# Patient Record
Sex: FEMALE | Race: WHITE | Employment: OTHER | URBAN - METROPOLITAN AREA
[De-identification: names, ages, dates, MRNs, and addresses within clinical notes are randomized per-mention and may not be internally consistent; named-entity substitution may affect disease eponyms.]

---

## 2017-05-02 ENCOUNTER — TELEPHONE (OUTPATIENT)
Dept: GASTROENTEROLOGY | Age: 62
End: 2017-05-02

## 2017-05-02 DIAGNOSIS — Z12.11 COLON CANCER SCREENING: Primary | ICD-10-CM

## 2017-05-05 PROBLEM — Z12.11 COLON CANCER SCREENING: Status: ACTIVE | Noted: 2017-05-05

## 2017-05-30 ENCOUNTER — ANESTHESIA EVENT (OUTPATIENT)
Dept: OPERATING ROOM | Age: 62
End: 2017-05-30
Payer: COMMERCIAL

## 2017-05-30 ENCOUNTER — ANESTHESIA (OUTPATIENT)
Dept: OPERATING ROOM | Age: 62
End: 2017-05-30
Payer: COMMERCIAL

## 2017-05-30 ENCOUNTER — HOSPITAL ENCOUNTER (OUTPATIENT)
Age: 62
Setting detail: OUTPATIENT SURGERY
Discharge: HOME | End: 2017-05-30
Attending: INTERNAL MEDICINE | Admitting: INTERNAL MEDICINE
Payer: COMMERCIAL

## 2017-05-30 VITALS
WEIGHT: 209 LBS | RESPIRATION RATE: 16 BRPM | HEART RATE: 62 BPM | SYSTOLIC BLOOD PRESSURE: 123 MMHG | DIASTOLIC BLOOD PRESSURE: 62 MMHG | TEMPERATURE: 98.6 F | OXYGEN SATURATION: 100 % | BODY MASS INDEX: 33.59 KG/M2 | HEIGHT: 66 IN

## 2017-05-30 VITALS
SYSTOLIC BLOOD PRESSURE: 110 MMHG | OXYGEN SATURATION: 100 % | DIASTOLIC BLOOD PRESSURE: 54 MMHG | RESPIRATION RATE: 17 BRPM

## 2017-05-30 PROCEDURE — 6360000002 HC RX W HCPCS: Performed by: NURSE ANESTHETIST, CERTIFIED REGISTERED

## 2017-05-30 PROCEDURE — 2580000003 HC RX 258: Performed by: INTERNAL MEDICINE

## 2017-05-30 PROCEDURE — 2500000003 HC RX 250 WO HCPCS: Performed by: NURSE ANESTHETIST, CERTIFIED REGISTERED

## 2017-05-30 PROCEDURE — 3700000001 HC ADD 15 MINUTES (ANESTHESIA): Performed by: INTERNAL MEDICINE

## 2017-05-30 PROCEDURE — 3609027000 HC COLONOSCOPY: Performed by: INTERNAL MEDICINE

## 2017-05-30 PROCEDURE — 3700000000 HC ANESTHESIA ATTENDED CARE: Performed by: INTERNAL MEDICINE

## 2017-05-30 PROCEDURE — 7100000010 HC PHASE II RECOVERY - FIRST 15 MIN: Performed by: INTERNAL MEDICINE

## 2017-05-30 PROCEDURE — 7100000011 HC PHASE II RECOVERY - ADDTL 15 MIN: Performed by: INTERNAL MEDICINE

## 2017-05-30 PROCEDURE — 45378 DIAGNOSTIC COLONOSCOPY: CPT | Performed by: INTERNAL MEDICINE

## 2017-05-30 RX ORDER — LIDOCAINE HYDROCHLORIDE 20 MG/ML
INJECTION, SOLUTION INFILTRATION; PERINEURAL PRN
Status: DISCONTINUED | OUTPATIENT
Start: 2017-05-30 | End: 2017-05-30 | Stop reason: SDUPTHER

## 2017-05-30 RX ORDER — FENTANYL CITRATE 50 UG/ML
INJECTION, SOLUTION INTRAMUSCULAR; INTRAVENOUS PRN
Status: DISCONTINUED | OUTPATIENT
Start: 2017-05-30 | End: 2017-05-30 | Stop reason: SDUPTHER

## 2017-05-30 RX ORDER — PROPOFOL 10 MG/ML
INJECTION, EMULSION INTRAVENOUS PRN
Status: DISCONTINUED | OUTPATIENT
Start: 2017-05-30 | End: 2017-05-30 | Stop reason: SDUPTHER

## 2017-05-30 RX ORDER — SODIUM CHLORIDE, SODIUM LACTATE, POTASSIUM CHLORIDE, CALCIUM CHLORIDE 600; 310; 30; 20 MG/100ML; MG/100ML; MG/100ML; MG/100ML
INJECTION, SOLUTION INTRAVENOUS CONTINUOUS
Status: DISCONTINUED | OUTPATIENT
Start: 2017-05-30 | End: 2017-05-30 | Stop reason: HOSPADM

## 2017-05-30 RX ORDER — MIDAZOLAM HYDROCHLORIDE 1 MG/ML
INJECTION INTRAMUSCULAR; INTRAVENOUS PRN
Status: DISCONTINUED | OUTPATIENT
Start: 2017-05-30 | End: 2017-05-30 | Stop reason: SDUPTHER

## 2017-05-30 RX ADMIN — SODIUM CHLORIDE, POTASSIUM CHLORIDE, SODIUM LACTATE AND CALCIUM CHLORIDE: 600; 310; 30; 20 INJECTION, SOLUTION INTRAVENOUS at 08:31

## 2017-05-30 RX ADMIN — MIDAZOLAM HYDROCHLORIDE 2 MG: 1 INJECTION, SOLUTION INTRAMUSCULAR; INTRAVENOUS at 09:21

## 2017-05-30 RX ADMIN — PROPOFOL 50 MG: 10 INJECTION, EMULSION INTRAVENOUS at 09:21

## 2017-05-30 RX ADMIN — FENTANYL CITRATE 25 MCG: 50 INJECTION INTRAMUSCULAR; INTRAVENOUS at 09:21

## 2017-05-30 RX ADMIN — LIDOCAINE HYDROCHLORIDE 100 MG: 20 INJECTION, SOLUTION INFILTRATION; PERINEURAL at 09:21

## 2017-05-30 RX ADMIN — FENTANYL CITRATE 50 MCG: 50 INJECTION INTRAMUSCULAR; INTRAVENOUS at 09:23

## 2017-05-30 RX ADMIN — SODIUM CHLORIDE, POTASSIUM CHLORIDE, SODIUM LACTATE AND CALCIUM CHLORIDE: 600; 310; 30; 20 INJECTION, SOLUTION INTRAVENOUS at 09:12

## 2017-05-30 ASSESSMENT — PAIN - FUNCTIONAL ASSESSMENT: PAIN_FUNCTIONAL_ASSESSMENT: 0-10

## 2017-05-30 ASSESSMENT — PAIN SCALES - GENERAL
PAINLEVEL_OUTOF10: 0

## 2018-01-18 ENCOUNTER — HOSPITAL ENCOUNTER (OUTPATIENT)
Dept: WOMENS IMAGING | Age: 63
Discharge: HOME | End: 2018-01-18
Payer: COMMERCIAL

## 2018-01-18 DIAGNOSIS — Z12.31 VISIT FOR SCREENING MAMMOGRAM: ICD-10-CM

## 2018-01-18 PROCEDURE — 77067 SCR MAMMO BI INCL CAD: CPT

## 2018-09-26 PROBLEM — Z12.11 COLON CANCER SCREENING: Status: RESOLVED | Noted: 2017-05-05 | Resolved: 2018-09-26

## 2019-01-31 ENCOUNTER — HOSPITAL ENCOUNTER (OUTPATIENT)
Dept: WOMENS IMAGING | Age: 64
Discharge: HOME | End: 2019-02-02
Payer: COMMERCIAL

## 2019-01-31 DIAGNOSIS — Z12.39 BREAST CANCER SCREENING: ICD-10-CM

## 2019-01-31 PROCEDURE — 77063 BREAST TOMOSYNTHESIS BI: CPT

## 2020-02-28 ENCOUNTER — HOSPITAL ENCOUNTER (OUTPATIENT)
Dept: WOMENS IMAGING | Age: 65
Discharge: HOME | End: 2020-03-01
Payer: COMMERCIAL

## 2020-02-28 PROCEDURE — 77063 BREAST TOMOSYNTHESIS BI: CPT

## 2021-03-04 ENCOUNTER — HOSPITAL ENCOUNTER (OUTPATIENT)
Dept: WOMENS IMAGING | Age: 66
Discharge: HOME | End: 2021-03-06
Payer: COMMERCIAL

## 2021-03-04 DIAGNOSIS — Z12.31 BREAST CANCER SCREENING BY MAMMOGRAM: ICD-10-CM

## 2021-03-04 PROCEDURE — 77063 BREAST TOMOSYNTHESIS BI: CPT

## 2021-10-28 ENCOUNTER — APPOINTMENT (OUTPATIENT)
Dept: GENERAL RADIOLOGY | Age: 66
End: 2021-10-28
Payer: COMMERCIAL

## 2021-10-28 ENCOUNTER — HOSPITAL ENCOUNTER (EMERGENCY)
Age: 66
Discharge: ANOTHER HOSPITAL | End: 2021-10-29
Attending: EMERGENCY MEDICINE
Payer: COMMERCIAL

## 2021-10-28 DIAGNOSIS — I21.4 NSTEMI (NON-ST ELEVATED MYOCARDIAL INFARCTION) (HCC): Primary | ICD-10-CM

## 2021-10-28 LAB
ABSOLUTE EOS #: 0.07 K/UL (ref 0–0.44)
ABSOLUTE IMMATURE GRANULOCYTE: 0.03 K/UL (ref 0–0.3)
ABSOLUTE LYMPH #: 2.61 K/UL (ref 1.1–3.7)
ABSOLUTE MONO #: 1.08 K/UL (ref 0.1–1.2)
ALBUMIN SERPL-MCNC: 4.4 G/DL (ref 3.5–5.2)
ALBUMIN/GLOBULIN RATIO: 1.6 (ref 1–2.5)
ALP BLD-CCNC: 97 U/L (ref 35–104)
ALT SERPL-CCNC: 19 U/L (ref 5–33)
ANION GAP SERPL CALCULATED.3IONS-SCNC: 14 MMOL/L (ref 9–17)
AST SERPL-CCNC: 23 U/L
BASOPHILS # BLD: 0 % (ref 0–2)
BASOPHILS ABSOLUTE: 0.03 K/UL (ref 0–0.2)
BILIRUB SERPL-MCNC: 0.41 MG/DL (ref 0.3–1.2)
BUN BLDV-MCNC: 13 MG/DL (ref 8–23)
BUN/CREAT BLD: 16 (ref 9–20)
CALCIUM SERPL-MCNC: 9.8 MG/DL (ref 8.6–10.4)
CHLORIDE BLD-SCNC: 93 MMOL/L (ref 98–107)
CO2: 28 MMOL/L (ref 20–31)
CREAT SERPL-MCNC: 0.8 MG/DL (ref 0.5–0.9)
D-DIMER QUANTITATIVE: <0.27 MG/L FEU (ref 0–0.59)
DIFFERENTIAL TYPE: ABNORMAL
EOSINOPHILS RELATIVE PERCENT: 1 % (ref 1–4)
GFR AFRICAN AMERICAN: >60 ML/MIN
GFR NON-AFRICAN AMERICAN: >60 ML/MIN
GFR SERPL CREATININE-BSD FRML MDRD: ABNORMAL ML/MIN/{1.73_M2}
GFR SERPL CREATININE-BSD FRML MDRD: ABNORMAL ML/MIN/{1.73_M2}
GLUCOSE BLD-MCNC: 129 MG/DL (ref 70–99)
HCT VFR BLD CALC: 39.6 % (ref 36.3–47.1)
HEMOGLOBIN: 13.6 G/DL (ref 11.9–15.1)
IMMATURE GRANULOCYTES: 0 %
INR BLD: 1
LYMPHOCYTES # BLD: 30 % (ref 24–43)
MCH RBC QN AUTO: 30.2 PG (ref 25.2–33.5)
MCHC RBC AUTO-ENTMCNC: 34.3 G/DL (ref 28.4–34.8)
MCV RBC AUTO: 88 FL (ref 82.6–102.9)
MONOCYTES # BLD: 13 % (ref 3–12)
NRBC AUTOMATED: 0 PER 100 WBC
PARTIAL THROMBOPLASTIN TIME: 25.8 SEC (ref 23.9–33.8)
PDW BLD-RTO: 12.3 % (ref 11.8–14.4)
PLATELET # BLD: 335 K/UL (ref 138–453)
PLATELET ESTIMATE: ABNORMAL
PMV BLD AUTO: 8.9 FL (ref 8.1–13.5)
POTASSIUM SERPL-SCNC: 3.7 MMOL/L (ref 3.7–5.3)
PROTHROMBIN TIME: 13.2 SEC (ref 11.5–14.2)
RBC # BLD: 4.5 M/UL (ref 3.95–5.11)
RBC # BLD: ABNORMAL 10*6/UL
SEG NEUTROPHILS: 56 % (ref 36–65)
SEGMENTED NEUTROPHILS ABSOLUTE COUNT: 4.8 K/UL (ref 1.5–8.1)
SODIUM BLD-SCNC: 135 MMOL/L (ref 135–144)
TOTAL PROTEIN: 7.1 G/DL (ref 6.4–8.3)
TROPONIN INTERP: ABNORMAL
TROPONIN T: ABNORMAL NG/ML
TROPONIN, HIGH SENSITIVITY: 33 NG/L (ref 0–14)
TROPONIN, HIGH SENSITIVITY: 45 NG/L (ref 0–14)
TROPONIN, HIGH SENSITIVITY: 64 NG/L (ref 0–14)
WBC # BLD: 8.6 K/UL (ref 3.5–11.3)
WBC # BLD: ABNORMAL 10*3/UL

## 2021-10-28 PROCEDURE — 85025 COMPLETE CBC W/AUTO DIFF WBC: CPT

## 2021-10-28 PROCEDURE — 85730 THROMBOPLASTIN TIME PARTIAL: CPT

## 2021-10-28 PROCEDURE — 84484 ASSAY OF TROPONIN QUANT: CPT

## 2021-10-28 PROCEDURE — 6360000002 HC RX W HCPCS: Performed by: EMERGENCY MEDICINE

## 2021-10-28 PROCEDURE — 36415 COLL VENOUS BLD VENIPUNCTURE: CPT

## 2021-10-28 PROCEDURE — 99285 EMERGENCY DEPT VISIT HI MDM: CPT

## 2021-10-28 PROCEDURE — 85610 PROTHROMBIN TIME: CPT

## 2021-10-28 PROCEDURE — 85379 FIBRIN DEGRADATION QUANT: CPT

## 2021-10-28 PROCEDURE — 93005 ELECTROCARDIOGRAM TRACING: CPT | Performed by: EMERGENCY MEDICINE

## 2021-10-28 PROCEDURE — 80053 COMPREHEN METABOLIC PANEL: CPT

## 2021-10-28 PROCEDURE — 96372 THER/PROPH/DIAG INJ SC/IM: CPT

## 2021-10-28 PROCEDURE — 71045 X-RAY EXAM CHEST 1 VIEW: CPT

## 2021-10-28 PROCEDURE — 6370000000 HC RX 637 (ALT 250 FOR IP): Performed by: EMERGENCY MEDICINE

## 2021-10-28 RX ORDER — ATORVASTATIN CALCIUM 40 MG/1
20 TABLET, FILM COATED ORAL ONCE
Status: COMPLETED | OUTPATIENT
Start: 2021-10-28 | End: 2021-10-28

## 2021-10-28 RX ORDER — METOPROLOL TARTRATE 50 MG/1
25 TABLET, FILM COATED ORAL ONCE
Status: COMPLETED | OUTPATIENT
Start: 2021-10-28 | End: 2021-10-28

## 2021-10-28 RX ADMIN — ENOXAPARIN SODIUM 100 MG: 100 INJECTION SUBCUTANEOUS at 14:45

## 2021-10-28 RX ADMIN — ATORVASTATIN CALCIUM 20 MG: 40 TABLET, FILM COATED ORAL at 13:36

## 2021-10-28 RX ADMIN — METOPROLOL TARTRATE 25 MG: 50 TABLET, FILM COATED ORAL at 13:32

## 2021-10-28 ASSESSMENT — PAIN DESCRIPTION - LOCATION: LOCATION: CHEST

## 2021-10-28 ASSESSMENT — PAIN DESCRIPTION - FREQUENCY: FREQUENCY: CONTINUOUS

## 2021-10-28 ASSESSMENT — PAIN SCALES - GENERAL: PAINLEVEL_OUTOF10: 2

## 2021-10-28 ASSESSMENT — PAIN DESCRIPTION - DESCRIPTORS: DESCRIPTORS: TIGHTNESS

## 2021-10-28 ASSESSMENT — PAIN DESCRIPTION - PAIN TYPE: TYPE: ACUTE PAIN

## 2021-10-28 ASSESSMENT — PAIN DESCRIPTION - ORIENTATION: ORIENTATION: MID;ANTERIOR

## 2021-10-29 ENCOUNTER — HOSPITAL ENCOUNTER (INPATIENT)
Age: 66
LOS: 4 days | Discharge: HOME | DRG: 282 | End: 2021-11-02
Attending: EMERGENCY MEDICINE
Payer: COMMERCIAL

## 2021-10-29 ENCOUNTER — APPOINTMENT (OUTPATIENT)
Dept: CT IMAGING | Age: 66
DRG: 282 | End: 2021-10-29
Payer: COMMERCIAL

## 2021-10-29 VITALS
RESPIRATION RATE: 20 BRPM | WEIGHT: 211 LBS | OXYGEN SATURATION: 97 % | BODY MASS INDEX: 34.06 KG/M2 | SYSTOLIC BLOOD PRESSURE: 169 MMHG | TEMPERATURE: 98.1 F | HEART RATE: 91 BPM | DIASTOLIC BLOOD PRESSURE: 64 MMHG

## 2021-10-29 DIAGNOSIS — R77.8 ELEVATED TROPONIN: Primary | ICD-10-CM

## 2021-10-29 PROBLEM — I10 HTN (HYPERTENSION): Status: ACTIVE | Noted: 2021-10-29

## 2021-10-29 PROBLEM — I21.4 NSTEMI (NON-ST ELEVATED MYOCARDIAL INFARCTION) (HCC): Status: ACTIVE | Noted: 2021-10-29

## 2021-10-29 PROBLEM — J45.20 MILD INTERMITTENT ASTHMA WITHOUT COMPLICATION: Status: ACTIVE | Noted: 2021-10-29

## 2021-10-29 LAB
EKG ATRIAL RATE: 72 BPM
EKG ATRIAL RATE: 77 BPM
EKG P AXIS: 48 DEGREES
EKG P AXIS: 58 DEGREES
EKG P-R INTERVAL: 186 MS
EKG P-R INTERVAL: 198 MS
EKG Q-T INTERVAL: 408 MS
EKG Q-T INTERVAL: 412 MS
EKG QRS DURATION: 78 MS
EKG QRS DURATION: 80 MS
EKG QTC CALCULATION (BAZETT): 446 MS
EKG QTC CALCULATION (BAZETT): 466 MS
EKG R AXIS: 18 DEGREES
EKG R AXIS: 5 DEGREES
EKG T AXIS: 53 DEGREES
EKG T AXIS: 66 DEGREES
EKG VENTRICULAR RATE: 72 BPM
EKG VENTRICULAR RATE: 77 BPM
HCT VFR BLD CALC: 37.3 % (ref 36.3–47.1)
HEMOGLOBIN: 12.7 G/DL (ref 11.9–15.1)
MCH RBC QN AUTO: 30.2 PG (ref 25.2–33.5)
MCHC RBC AUTO-ENTMCNC: 34 G/DL (ref 28.4–34.8)
MCV RBC AUTO: 88.6 FL (ref 82.6–102.9)
NRBC AUTOMATED: 0 PER 100 WBC
PARTIAL THROMBOPLASTIN TIME: 25.2 SEC (ref 20.5–30.5)
PDW BLD-RTO: 12.6 % (ref 11.8–14.4)
PLATELET # BLD: 334 K/UL (ref 138–453)
PMV BLD AUTO: 9.6 FL (ref 8.1–13.5)
RBC # BLD: 4.21 M/UL (ref 3.95–5.11)
TROPONIN INTERP: ABNORMAL
TROPONIN T: ABNORMAL NG/ML
TROPONIN, HIGH SENSITIVITY: 91 NG/L (ref 0–14)
WBC # BLD: 7.9 K/UL (ref 3.5–11.3)

## 2021-10-29 PROCEDURE — 71275 CT ANGIOGRAPHY CHEST: CPT

## 2021-10-29 PROCEDURE — 85027 COMPLETE CBC AUTOMATED: CPT

## 2021-10-29 PROCEDURE — 6360000002 HC RX W HCPCS: Performed by: EMERGENCY MEDICINE

## 2021-10-29 PROCEDURE — 6360000004 HC RX CONTRAST MEDICATION: Performed by: STUDENT IN AN ORGANIZED HEALTH CARE EDUCATION/TRAINING PROGRAM

## 2021-10-29 PROCEDURE — 84484 ASSAY OF TROPONIN QUANT: CPT

## 2021-10-29 PROCEDURE — 6370000000 HC RX 637 (ALT 250 FOR IP): Performed by: STUDENT IN AN ORGANIZED HEALTH CARE EDUCATION/TRAINING PROGRAM

## 2021-10-29 PROCEDURE — 93005 ELECTROCARDIOGRAM TRACING: CPT | Performed by: STUDENT IN AN ORGANIZED HEALTH CARE EDUCATION/TRAINING PROGRAM

## 2021-10-29 PROCEDURE — 85730 THROMBOPLASTIN TIME PARTIAL: CPT

## 2021-10-29 PROCEDURE — 99285 EMERGENCY DEPT VISIT HI MDM: CPT

## 2021-10-29 PROCEDURE — 93010 ELECTROCARDIOGRAM REPORT: CPT | Performed by: INTERNAL MEDICINE

## 2021-10-29 PROCEDURE — 6360000002 HC RX W HCPCS: Performed by: STUDENT IN AN ORGANIZED HEALTH CARE EDUCATION/TRAINING PROGRAM

## 2021-10-29 PROCEDURE — 2060000000 HC ICU INTERMEDIATE R&B

## 2021-10-29 PROCEDURE — 99223 1ST HOSP IP/OBS HIGH 75: CPT | Performed by: NURSE PRACTITIONER

## 2021-10-29 RX ORDER — SODIUM CHLORIDE 0.9 % (FLUSH) 0.9 %
10 SYRINGE (ML) INJECTION PRN
Status: DISCONTINUED | OUTPATIENT
Start: 2021-10-29 | End: 2021-11-02 | Stop reason: HOSPADM

## 2021-10-29 RX ORDER — POTASSIUM BICARBONATE 25 MEQ/1
50 TABLET, EFFERVESCENT ORAL PRN
Status: DISCONTINUED | OUTPATIENT
Start: 2021-10-29 | End: 2021-11-02 | Stop reason: HOSPADM

## 2021-10-29 RX ORDER — POTASSIUM CHLORIDE 20 MEQ/1
40 TABLET, EXTENDED RELEASE ORAL PRN
Status: DISCONTINUED | OUTPATIENT
Start: 2021-10-29 | End: 2021-11-02 | Stop reason: HOSPADM

## 2021-10-29 RX ORDER — MAGNESIUM SULFATE 1 G/100ML
1000 INJECTION INTRAVENOUS PRN
Status: DISCONTINUED | OUTPATIENT
Start: 2021-10-29 | End: 2021-11-02 | Stop reason: HOSPADM

## 2021-10-29 RX ORDER — ONDANSETRON 4 MG/1
4 TABLET, ORALLY DISINTEGRATING ORAL EVERY 8 HOURS PRN
Status: DISCONTINUED | OUTPATIENT
Start: 2021-10-29 | End: 2021-11-02 | Stop reason: HOSPADM

## 2021-10-29 RX ORDER — SODIUM CHLORIDE 0.9 % (FLUSH) 0.9 %
5-40 SYRINGE (ML) INJECTION EVERY 12 HOURS SCHEDULED
Status: DISCONTINUED | OUTPATIENT
Start: 2021-10-29 | End: 2021-11-02 | Stop reason: HOSPADM

## 2021-10-29 RX ORDER — CETIRIZINE HYDROCHLORIDE 10 MG/1
10 TABLET ORAL DAILY
Status: DISCONTINUED | OUTPATIENT
Start: 2021-10-29 | End: 2021-11-02 | Stop reason: HOSPADM

## 2021-10-29 RX ORDER — HYDROCHLOROTHIAZIDE 25 MG/1
50 TABLET ORAL DAILY
Status: DISCONTINUED | OUTPATIENT
Start: 2021-10-30 | End: 2021-10-30

## 2021-10-29 RX ORDER — HEPARIN SODIUM 10000 [USP'U]/100ML
5-30 INJECTION, SOLUTION INTRAVENOUS CONTINUOUS
Status: DISCONTINUED | OUTPATIENT
Start: 2021-10-29 | End: 2021-11-02 | Stop reason: HOSPADM

## 2021-10-29 RX ORDER — CITALOPRAM 10 MG/1
10 TABLET ORAL DAILY
Status: DISCONTINUED | OUTPATIENT
Start: 2021-10-30 | End: 2021-11-02 | Stop reason: HOSPADM

## 2021-10-29 RX ORDER — FLUTICASONE PROPIONATE 50 MCG
2 SPRAY, SUSPENSION (ML) NASAL DAILY
Status: DISCONTINUED | OUTPATIENT
Start: 2021-10-29 | End: 2021-11-02 | Stop reason: HOSPADM

## 2021-10-29 RX ORDER — METOPROLOL TARTRATE 50 MG/1
25 TABLET, FILM COATED ORAL 2 TIMES DAILY
Status: DISCONTINUED | OUTPATIENT
Start: 2021-10-29 | End: 2021-11-02 | Stop reason: HOSPADM

## 2021-10-29 RX ORDER — ATORVASTATIN CALCIUM 20 MG/1
20 TABLET, FILM COATED ORAL DAILY
Status: DISCONTINUED | OUTPATIENT
Start: 2021-10-30 | End: 2021-10-30

## 2021-10-29 RX ORDER — HEPARIN SODIUM 1000 [USP'U]/ML
60 INJECTION, SOLUTION INTRAVENOUS; SUBCUTANEOUS PRN
Status: DISCONTINUED | OUTPATIENT
Start: 2021-10-29 | End: 2021-10-29 | Stop reason: SDUPTHER

## 2021-10-29 RX ORDER — ATORVASTATIN CALCIUM 80 MG/1
80 TABLET, FILM COATED ORAL NIGHTLY
Status: DISCONTINUED | OUTPATIENT
Start: 2021-10-29 | End: 2021-11-02 | Stop reason: HOSPADM

## 2021-10-29 RX ORDER — HEPARIN SODIUM 1000 [USP'U]/ML
2000 INJECTION, SOLUTION INTRAVENOUS; SUBCUTANEOUS PRN
Status: DISCONTINUED | OUTPATIENT
Start: 2021-10-29 | End: 2021-11-02 | Stop reason: HOSPADM

## 2021-10-29 RX ORDER — POTASSIUM CHLORIDE 7.45 MG/ML
10 INJECTION INTRAVENOUS PRN
Status: DISCONTINUED | OUTPATIENT
Start: 2021-10-29 | End: 2021-11-02 | Stop reason: HOSPADM

## 2021-10-29 RX ORDER — HEPARIN SODIUM 1000 [USP'U]/ML
30 INJECTION, SOLUTION INTRAVENOUS; SUBCUTANEOUS PRN
Status: DISCONTINUED | OUTPATIENT
Start: 2021-10-29 | End: 2021-10-29 | Stop reason: SDUPTHER

## 2021-10-29 RX ORDER — ONDANSETRON 2 MG/ML
4 INJECTION INTRAMUSCULAR; INTRAVENOUS EVERY 6 HOURS PRN
Status: DISCONTINUED | OUTPATIENT
Start: 2021-10-29 | End: 2021-11-02 | Stop reason: HOSPADM

## 2021-10-29 RX ORDER — NITROGLYCERIN 0.4 MG/1
0.4 TABLET SUBLINGUAL EVERY 5 MIN PRN
Status: DISCONTINUED | OUTPATIENT
Start: 2021-10-29 | End: 2021-11-02 | Stop reason: HOSPADM

## 2021-10-29 RX ORDER — HEPARIN SODIUM 10000 [USP'U]/100ML
5-30 INJECTION, SOLUTION INTRAVENOUS CONTINUOUS
Status: DISCONTINUED | OUTPATIENT
Start: 2021-10-29 | End: 2021-10-29

## 2021-10-29 RX ORDER — HEPARIN SODIUM 1000 [USP'U]/ML
4000 INJECTION, SOLUTION INTRAVENOUS; SUBCUTANEOUS PRN
Status: DISCONTINUED | OUTPATIENT
Start: 2021-10-29 | End: 2021-11-02 | Stop reason: HOSPADM

## 2021-10-29 RX ORDER — HEPARIN SODIUM 10000 [USP'U]/100ML
5-30 INJECTION, SOLUTION INTRAVENOUS CONTINUOUS
Status: DISCONTINUED | OUTPATIENT
Start: 2021-10-29 | End: 2021-10-29 | Stop reason: SDUPTHER

## 2021-10-29 RX ORDER — ACETAMINOPHEN 325 MG/1
650 TABLET ORAL EVERY 6 HOURS PRN
Status: DISCONTINUED | OUTPATIENT
Start: 2021-10-29 | End: 2021-11-01

## 2021-10-29 RX ORDER — SODIUM CHLORIDE 9 MG/ML
25 INJECTION, SOLUTION INTRAVENOUS PRN
Status: DISCONTINUED | OUTPATIENT
Start: 2021-10-29 | End: 2021-11-02 | Stop reason: HOSPADM

## 2021-10-29 RX ORDER — ACETAMINOPHEN 650 MG/1
650 SUPPOSITORY RECTAL EVERY 6 HOURS PRN
Status: DISCONTINUED | OUTPATIENT
Start: 2021-10-29 | End: 2021-11-02 | Stop reason: HOSPADM

## 2021-10-29 RX ORDER — ALBUTEROL SULFATE 2.5 MG/3ML
2.5 SOLUTION RESPIRATORY (INHALATION)
Status: DISCONTINUED | OUTPATIENT
Start: 2021-10-29 | End: 2021-11-02 | Stop reason: HOSPADM

## 2021-10-29 RX ADMIN — ENOXAPARIN SODIUM 100 MG: 100 INJECTION SUBCUTANEOUS at 07:31

## 2021-10-29 RX ADMIN — IOPAMIDOL 100 ML: 755 INJECTION, SOLUTION INTRAVENOUS at 19:36

## 2021-10-29 RX ADMIN — FLUTICASONE PROPIONATE 2 SPRAY: 50 SPRAY, METERED NASAL at 19:06

## 2021-10-29 RX ADMIN — HEPARIN SODIUM AND DEXTROSE 10.4 UNITS/KG/HR: 10000; 5 INJECTION INTRAVENOUS at 20:07

## 2021-10-29 RX ADMIN — CETIRIZINE HYDROCHLORIDE 10 MG: 10 TABLET ORAL at 19:06

## 2021-10-29 ASSESSMENT — ENCOUNTER SYMPTOMS
NAUSEA: 0
BACK PAIN: 0
COLOR CHANGE: 0
VOMITING: 0
SORE THROAT: 0
ABDOMINAL PAIN: 0
ABDOMINAL DISTENTION: 0
DIARRHEA: 0
COUGH: 0
SHORTNESS OF BREATH: 0
WHEEZING: 0
TROUBLE SWALLOWING: 0
CHEST TIGHTNESS: 1
PHOTOPHOBIA: 0

## 2021-10-29 NOTE — H&P
[facility]  Office: [address], DO, Lyle Eid, DO, Rick Crum, DO, Nivia Roldan Blood, DO, Parker Meade MD, Chuck Waller MD, Portia Nguyen MD, Jenn Dodge MD, Michelle Guzman MD, Rasheed Riggs MD, Olena Maria MD, Yuriy Greene MD, David Gore DO, Lynn Lara DO, Derrick Michelle MD,  Vianey Oseguera DO, Giselle Martinez MD, Frederick Amanda MD, Leon Foy MD, Glenn Contreras MD, Hi Grijalva MD, Tony Levine MD, Yuliya Bradford [facility], [facility] Daviddaniel, CNP, John Paul Lewis, CNP, Dany Rojas, CNS, Carey Xiong, CNP, Don Gutierrez, CNP, Chemo Dahl, CNP, Kishan Cox, CNP, Gómez Crow, CNP, Marat Bailey, CNP, Franko Pugh PA-C, Annabelle Santoyo, [facility], Krishna Chiu, CNP, Betsy Heck, CNP, Meka Hernadez, CNP, Flower Mcnally, CNP, Naida Doyle, CNP, Robbie Fitzpatrick, CNP, Joao Mcnulty, [address]    HISTORY AND PHYSICAL EXAMINATION            Date:   10/29/2021  Patient name:  Dominic Carrion  Date of admission:  10/29/2021  6:55 PM  MRN:   [id number]  Account:  [de-identified]  YOB: 1955  PCP:    Lotus Dudley MD  Room:   20/20  Code Status:    Full Code    Chief Complaint:     Chief Complaint   Patient presents with    Chest Pain     [facility]        History Obtained From:     patient, electronic medical record    History of Present Illness:     Dominic Carrion is a 61 y.o. Non- / non  female who presents with Chest Pain (transfer from Critical access hospital )   and is admitted to the hospital for the management of NSTEMI (non-ST elevated myocardial infarction) ([facility] [address] This is a 61 yr old female with underlying history of HTN and asthma who presents initially to [facility] with acute onset chest pressure with radiation down the right arm with associated flushing. Symptoms lasted approx 1 hour and were alleviated without intervention.  EKG on ER arrival without any acute ST or T wave changes but noted to have up trending troponin. Patient received prophylactic dose lovenox, asa, statin and bb while in ER and is transferred to our facility for further care and cardiology consultation. On arrival to our ER, patient remains CP free and without acute EKG changes. Case discussed with cardiology and patient started on heparin gtt. Patient is admitted to [facility] for further management of NSTEMI. Past Medical History:     Past Medical History:   Diagnosis Date    Asthma     Hyperlipidemia     Hypertension     IBS (irritable bowel syndrome)         Past Surgical History:     Past Surgical History:   Procedure Laterality Date    COLONOSCOPY  05/30/2017    Dr. Kerri Braden NOT  [address] IND N/A 5/30/2017    COLONOSCOPY performed by Tobi Monroy MD at [address]        Medications Prior to Admission:     Prior to Admission medications    Medication Sig Start Date End Date Taking?  Authorizing Provider   beclomethasone (QVAR) 80 MCG/ACT inhaler Inhale 1 puff into the lungs 2 times daily    Historical Provider, MD   citalopram (CELEXA) 10 MG tablet Take 10 mg by mouth daily    Historical Provider, MD   atorvastatin (LIPITOR) 20 MG tablet Take 20 mg by mouth daily    Historical Provider, MD   hydrochlorothiazide (HYDRODIURIL) 50 MG tablet Take 50 mg by mouth daily    Historical Provider, MD   dicyclomine (BENTYL) 20 MG tablet Take 20 mg by mouth 1/2 tab 4 times a week    Historical Provider, MD   Triamcinolone Acetonide (NASACORT AQ NA) by Nasal route 2 times daily 1 spray    Historical Provider, MD   albuterol sulfate  (90 BASE) MCG/ACT inhaler Inhale 2 puffs into the lungs as needed for Wheezing    Historical Provider, MD   Loratadine (CLARITIN) 10 MG CAPS Take 10 mg by mouth daily     Historical Provider, MD   Probiotic Product (ALIGN PO) Take 1 capsule by mouth daily     Historical Provider, MD        Allergies: Other    Social History:     Tobacco:    reports that she has never smoked. She has never used smokeless tobacco.  Alcohol:      reports current alcohol use. Drug Use:  reports no history of drug use. Family History:     Family History   Problem Relation Age of Onset   Sharla Safer Cancer Mother         pancreatic    High Blood Pressure Mother     Diabetes Mother     High Blood Pressure Father     High Cholesterol Father     Alzheimer's Disease Father     Other Father         A fib    High Blood Pressure Sister     High Cholesterol Sister     Cancer Paternal Aunt         breast    Cancer Paternal Uncle         unknown    Diabetes Maternal Grandmother     Diabetes Paternal Grandfather     Heart Disease Other         fathers side       Review of Systems:     Positive and Negative as described in HPI. Review of Systems   Constitutional: Negative for activity change, chills, diaphoresis and fatigue. HENT: Negative for sore throat and trouble swallowing. Eyes: Negative for photophobia and visual disturbance. Respiratory: Positive for chest tightness. Negative for cough, shortness of breath and wheezing. Cardiovascular: Positive for chest pain. Negative for palpitations and leg swelling. Gastrointestinal: Negative for abdominal distention, abdominal pain, diarrhea, nausea and vomiting. Endocrine: Negative for polyphagia and polyuria. Genitourinary: Negative for difficulty urinating and dysuria. Musculoskeletal: Negative for back pain and neck pain. Skin: Negative for color change, rash and wound. Neurological: Negative for dizziness, syncope, light-headedness and headaches. Psychiatric/Behavioral: Negative for agitation, behavioral problems and confusion. The patient is not nervous/anxious.         Physical Exam:   /62   Pulse 79   Temp 99.7 °F (37.6 °C) (Oral)   Resp 9   SpO2 94%   Temp (24hrs), Av.7 °F (37.6 °C), Min:99.7 °F (37.6 °C), Max:99.7 °F (37.6 °C)    No results for input(s): POCGLU in the last 72 hours. No intake or output data in the 24 hours ending 10/29/21 5077    Physical Exam  Vitals and nursing note reviewed. Constitutional:       General: She is not in acute distress. Appearance: Normal appearance. She is not toxic-appearing or diaphoretic. HENT:      Head: Normocephalic and atraumatic. Right Ear: External ear normal.      Left Ear: External ear normal.      Nose: Nose normal. No congestion or rhinorrhea. Mouth/Throat:      Mouth: Mucous membranes are dry. Pharynx: Oropharynx is clear. Eyes:      Extraocular Movements: Extraocular movements intact. Conjunctiva/sclera: Conjunctivae normal.      Pupils: Pupils are equal, round, and reactive to light. Cardiovascular:      Rate and Rhythm: Normal rate and regular rhythm. Pulses: Normal pulses. Heart sounds: Normal heart sounds. No murmur heard. No friction rub. No gallop. Pulmonary:      Effort: Pulmonary effort is normal. No respiratory distress. Breath sounds: Normal breath sounds. No wheezing, rhonchi or rales. Abdominal:      General: Bowel sounds are normal. There is no distension. Palpations: Abdomen is soft. There is no mass. Tenderness: There is no abdominal tenderness. Musculoskeletal:         General: No tenderness or signs of injury. Cervical back: Normal range of motion and neck supple. No rigidity or tenderness. Right lower leg: No edema. Left lower leg: No edema. Skin:     General: Skin is warm and dry. Capillary Refill: Capillary refill takes less than 2 seconds. Coloration: Skin is not jaundiced. Findings: No bruising, erythema or lesion. Neurological:      General: No focal deficit present. Mental Status: She is alert and oriented to person, place, and time. Mental status is at baseline. Cranial Nerves: No cranial nerve deficit.       Sensory: No sensory deficit. Motor: No weakness. Psychiatric:         Mood and Affect: Mood normal.         Behavior: Behavior normal.         Thought Content: Thought content normal.         Judgment: Judgment normal.         Investigations:      Laboratory Testing:  Recent Results (from the past 24 hour(s))   Troponin    Collection Time: 10/29/21  7:13 PM   Result Value Ref Range    Troponin, High Sensitivity 91 (HH) 0 - 14 ng/L    Troponin T NOT REPORTED <0.03 ng/mL    Troponin Interp NOT REPORTED    CBC    Collection Time: 10/29/21  8:11 PM   Result Value Ref Range    WBC 7.9 3.5 - 11.3 k/uL    RBC 4.21 3.95 - 5.11 m/uL    Hemoglobin 12.7 11.9 - 15.1 g/dL    Hematocrit 37.3 36.3 - 47.1 %    MCV 88.6 82.6 - 102.9 fL    MCH 30.2 25.2 - 33.5 pg    MCHC 34.0 28.4 - 34.8 g/dL    RDW 12.6 11.8 - 14.4 %    Platelets 766 955 - 539 k/uL    MPV 9.6 8.1 - 13.5 fL    NRBC Automated 0.0 0.0 per 100 WBC   APTT    Collection Time: 10/29/21  8:11 PM   Result Value Ref Range    PTT 25.2 20.5 - 30.5 sec       Imaging/Diagnostics:  XR CHEST PORTABLE    Result Date: 10/28/2021  No acute process. Assessment :      Hospital Problems         Last Modified POA    * (Principal) NSTEMI (non-ST elevated myocardial infarction) ([facility] [address] 10/29/2021 Yes    HTN (hypertension) 10/29/2021 Yes    Mild intermittent asthma without complication 34/50/7123 Yes          Plan:     Patient status inpatient in the Progressive Unit/Step down    1. NSTEMI: Cardiology consulted and appreciate continued recommendations, continue heparin gtt and trend troponin, prn nitro, supplemental oxygen if needed, telemetry and prn EKG and continue asa, statin, and bb  2. HTN: controlled, resume home dose HCTZ  3. Asthma: no evidence of acute exacerbation, prn albuterol available  4. Routine labs, home meds reviewed, clear liquid diet  5. DVT prophylaxis sufficient with heparin gtt  6.  Full Code    Consultations:   IP CONSULT TO CARDIOLOGY  IP CONSULT TO HOSPITALIST  IP CONSULT TO CARDIOLOGY    Patient is admitted as inpatient status because of co-morbidities listed above, severity of signs and symptoms as outlined, requirement for current medical therapies and most importantly because of direct risk to patient if care not provided in a hospital setting. Expected length of stay > 48 hours.     NATAN Pereira NP  10/29/2021  11:38 PM    Copy sent to Dr. Hamlet Lee MD

## 2021-10-29 NOTE — ED PROVIDER NOTES
none  Poor R wave progression    Clinical Impression: Abnormal EKG    Toño Banks, KARO Banks MD  10/29/21 1925

## 2021-10-29 NOTE — ED NOTES
Report given to [facility]. Vs ER nurse, pt en route to hospital.     Mateus Dinh RN  10/29/21 6201

## 2021-10-29 NOTE — ED NOTES
This writer called and spoke with Rosetta for bed update at [facility].  Per Rosetta at [facility] did not answer so she will try again in a few minutes and will call us back      Rajinder Leroy RN  10/29/21 1976

## 2021-10-29 NOTE — ED NOTES
Updated on plan to wait until 3p for [facility], lunch tray ordered     Romy Diez, RN  10/29/21 0524

## 2021-10-29 NOTE — ED NOTES
[facility] doctor called directly and spoke to Dr Cathryn Berman. Pt was accepted but he did not give his name or bed info before hanging up. Called [facility] access and notified them.  Access will call [facility] to follow up      Bianca Lua  10/28/21 5978

## 2021-10-29 NOTE — LETTER
[facility] [address]  [address] [facility]  Phone: [phone]        November 2, 2021     Patient: Dominic Carrion   YOB: 1955   Date of Visit: 10/29/2021       To Whom It May Concern: It is my medical opinion that Myriam Blakely may return to work on 11/8/2021. If you have any questions or concerns, please don't hesitate to call.     Sincerely,                Derrick Michelle MD  11/2/21

## 2021-10-29 NOTE — ED NOTES
Pt has decided she does not want to go to [place] and would rather wait for a bed at [facility].  Called access to [facility] and call [facility] to be added back to their waiting list.      Jitendra Mis  10/28/21 3766

## 2021-10-29 NOTE — ED NOTES
Bed: 20  Expected date:   Expected time:   Means of arrival:   Comments:  [address] [facility]  10/29/21 9230

## 2021-10-29 NOTE — ED PROVIDER NOTES
[address]  ED  Emergency Department Encounter  EmergencyMedicine Resident     Pt Name:Laureen Palacios  MRN: [id number]  [place] 1955  Date of evaluation: 10/29/21  PCP:  Walt Trivedi MD    This patient was evaluated in the Emergency Department for symptoms described in the history of present illness. The patient was evaluated in the context of the global COVID-19 pandemic, which necessitated consideration that the patient might be at risk for infection with the SARS-CoV-2 virus that causes COVID-19. Institutional protocols and algorithms that pertain to the evaluation of patients at risk for COVID-19 are in a state of rapid change based on information released by regulatory bodies including the CDC and federal and state organizations. These policies and algorithms were followed during the patient's care in the ED. CHIEF COMPLAINT       Chief Complaint   Patient presents with    Chest Pain     transfer from [place]        HISTORY OF PRESENT ILLNESS  (Location/Symptom, Timing/Onset, Context/Setting, Quality, Duration, Modifying Factors, Severity.)      Yasmin Mcwilliams is a 61 y.o. female who was transferred from [place] for elevated troponins, concern for NSTEMI. Yesterday she arrived there with substernal chest tightness with radiation to both arms at 9:45 AM.  She was given aspirin. She was then found to have elevated troponins (33 to 45 to 64). Last troponin was drawn yesterday at 2:40 PM.  She was started on Lovenox and has received it twice so far. She was transferred here as there was no beds available for her at [facility].  She currently is denying any chest pain and her only request is that she received some of her home medications including a Claritin and Nasacort nasal spray.     PAST MEDICAL / SURGICAL / SOCIAL / FAMILY HISTORY      has a past medical history of Asthma, Hyperlipidemia, Hypertension, and IBS (irritable bowel syndrome). has a past surgical history that includes Colonoscopy (05/30/2017); pr colon ca scrn not hi rsk ind (N/A, 5/30/2017); and Hemorrhoid surgery. Social History     Socioeconomic History    Marital status:      Spouse name: Not on file    Number of children: Not on file    Years of education: Not on file    Highest education level: Not on file   Occupational History    Not on file   Tobacco Use    Smoking status: Never Smoker    Smokeless tobacco: Never Used   Substance and Sexual Activity    Alcohol use: Yes     Comment: occas    Drug use: No    Sexual activity: Not on file   Other Topics Concern    Not on file   Social History Narrative    Not on file     Social Determinants of Health     Financial Resource Strain:     Difficulty of Paying Living Expenses:    Food Insecurity:     Worried About Running Out of Food in the Last Year:     [address] in the Last Year:    Transportation Needs:     Lack of Transportation (Medical):      Lack of Transportation (Non-Medical):    Physical Activity:     Days of Exercise per Week:     Minutes of Exercise per Session:    Stress:     Feeling of Stress :    Social Connections:     Frequency of Communication with Friends and Family:     Frequency of Social Gatherings with Friends and Family:     Attends [employer] Services:     Active Member of Clubs or Organizations:     Attends Club or Organization Meetings:     Marital Status:    Intimate Partner Violence:     Fear of Current or Ex-Partner:     Emotionally Abused:     Physically Abused:     Sexually Abused:        Family History   Problem Relation Age of Onset    Cancer Mother         pancreatic    High Blood Pressure Mother     Diabetes Mother     High Blood Pressure Father     High Cholesterol Father     Alzheimer's Disease Father     Other Father         A fib    High Blood Pressure Sister     High Cholesterol Sister     Cancer Paternal Aunt         breast    Cancer Paternal Uncle         unknown    Diabetes Maternal Grandmother     Diabetes Paternal Grandfather     Heart Disease Other         fathers side       Allergies: Other    Home Medications:  Prior to Admission medications    Medication Sig Start Date End Date Taking? Authorizing Provider   beclomethasone (QVAR) 80 MCG/ACT inhaler Inhale 1 puff into the lungs 2 times daily    Historical Provider, MD   citalopram (CELEXA) 10 MG tablet Take 10 mg by mouth daily    Historical Provider, MD   atorvastatin (LIPITOR) 20 MG tablet Take 20 mg by mouth daily    Historical Provider, MD   hydrochlorothiazide (HYDRODIURIL) 50 MG tablet Take 50 mg by mouth daily    Historical Provider, MD   dicyclomine (BENTYL) 20 MG tablet Take 20 mg by mouth 1/2 tab 4 times a week    Historical Provider, MD   Triamcinolone Acetonide (NASACORT AQ NA) by Nasal route 2 times daily 1 spray    Historical Provider, MD   albuterol sulfate  (90 BASE) MCG/ACT inhaler Inhale 2 puffs into the lungs as needed for Wheezing    Historical Provider, MD   Loratadine (CLARITIN) 10 MG CAPS Take 10 mg by mouth daily     Historical Provider, MD   Probiotic Product (ALIGN PO) Take 1 capsule by mouth daily     Historical Provider, MD       REVIEW OF SYSTEMS    (2-9 systems for level 4, 10 or more for level 5)      Review of Systems   Constitutional: Negative for fever. HENT: Positive for congestion. Respiratory: Negative for shortness of breath. Cardiovascular: Negative for chest pain. Gastrointestinal: Negative for abdominal pain. Musculoskeletal: Negative for back pain. Skin: Negative for wound. Allergic/Immunologic:        Allergy to \"Floxin's \"   Neurological: Negative for headaches. Hematological:        Given 2 doses of Lovenox   Psychiatric/Behavioral: Negative for confusion.        PHYSICAL EXAM   (up to 7 for level 4, 8 or more for level 5)      INITIAL VITALS:   BP (!) 168/72   Pulse 95   Temp 99.7 °F (37.6 °C) (Oral) Resp 18   SpO2 94%     Physical Exam  Constitutional:       General: She is not in acute distress. HENT:      Head: Normocephalic and atraumatic. Eyes:      Conjunctiva/sclera: Conjunctivae normal.   Cardiovascular:      Rate and Rhythm: Tachycardia present. Heart sounds: Normal heart sounds. Pulmonary:      Effort: Pulmonary effort is normal.      Breath sounds: Normal breath sounds. Abdominal:      General: Abdomen is flat. There is no distension. Palpations: Abdomen is soft. Tenderness: There is no abdominal tenderness. Musculoskeletal:      Cervical back: Neck supple. Right lower leg: No edema. Left lower leg: No edema. Skin:     General: Skin is warm and dry. Capillary Refill: Capillary refill takes less than 2 seconds. Neurological:      General: No focal deficit present. Mental Status: She is alert. Psychiatric:         Mood and Affect: Mood normal.         DIFFERENTIAL  DIAGNOSIS     PLAN (LABS / IMAGING / EKG):  Orders Placed This Encounter   Procedures    CTA CHEST W WO CONTRAST    Troponin    CBC    CBC    APTT    Inpatient consult to Cardiology    Inpatient consult to Hospitalist    EKG 12 Lead    PATIENT STATUS (FROM ED OR OR/PROCEDURAL) Inpatient       MEDICATIONS ORDERED:  Orders Placed This Encounter   Medications    fluticasone (FLONASE) 50 MCG/ACT nasal spray 2 spray    cetirizine (ZYRTEC) tablet 10 mg    iopamidol (ISOVUE-370) 76 % injection 100 mL    DISCONTD: heparin 25,000 units in dextrose 5% 250 mL (premix) infusion     Cardiology recommended starting heparin drip without bolus, please advise.     heparin (porcine) injection 4,000 Units    heparin (porcine) injection 2,000 Units    heparin 25,000 units in dextrose 5% 250 mL (premix) infusion       DDX: NSTEMI, ACS, stable angina, GERD, allergies    DIAGNOSTIC RESULTS / EMERGENCY DEPARTMENT COURSE / MDM   LAB RESULTS:  Results for orders placed or performed during the hospital process. There is no effusion or pneumothorax. The cardiomediastinal silhouette is without acute process. The osseous structures are without acute process. No acute process. EKG  Regular rate and rhythm, no ST segment elevations    All EKG's are interpreted by the Emergency Department Physician who either signs or Co-signs this chart in the absence of a cardiologist.    EMERGENCY DEPARTMENT COURSE:  ED Course as of Oct 29 2017   Fri Oct 29, 2021   1933 Spoke with cardiology regarding patient's NSTEMI, recommend starting heparin drip without initial bolus. [ML]   1952 Troponin, High Sensitivity(!!): 91 [ML]   1954 Discussed with [facility] will admit. [ML]   2006 No acute findings with no evidence of aortic dissection or pulmonary embolism. The thoracic aorta is normal in caliber with mild atherosclerosis.     Mild bibasilar atelectasis in the lungs. [ML]      ED Course User Index  [ML] Soledad Torres DO       CONSULTS:  IP CONSULT TO CARDIOLOGY  IP CONSULT TO HOSPITALIST  IP CONSULT TO CARDIOLOGY      FINAL IMPRESSION      1.  Elevated troponin          DISPOSITION / PLAN     DISPOSITION Admitted 10/29/2021 07:57:16 PM     [facility]  [facility] Resident    (Please note that portions of thisnote were completed with a voice recognition program.  Efforts were made to edit the dictations but occasionally words are mis-transcribed.)       Soledad Torres DO  Resident  10/29/21 2018

## 2021-10-29 NOTE — ED NOTES
Pt accepted and assigned bed at [facility].  Waiting for jessica Martínez May  10/28/21 8992

## 2021-10-29 NOTE — ED NOTES
Nursing supervisor Thuy Moore called to obtain update on patient.  Pt update given, she states that she is still working on a bed for her       Miles BRYANT Cartwright  10/29/21 3518

## 2021-10-29 NOTE — ED PROVIDER NOTES
Care of Paulino Farris was assumed from previous attending and is being seen for Chest Pain (mid sternal chest pain, onset about 15 min PTA. Pt states she took 650 mg of ASA PTA)  . The patient's initial evaluation and plan have been discussed with the prior provider who initially evaluated the patient. Handoff taken on the following patient from prior Attending Physician:    Rissa Wilcox    I was available and discussed any additional care issues that arose and coordinated the management plans with the resident(s) caring for the patient during my duty period. Any areas of disagreement with residents documentation of care or procedures are noted on the chart. I was personally present for the key portions of any/all procedures during my duty period. I have documented in the chart those procedures where I was not present during the key portions.       EMERGENCY DEPARTMENT COURSE / MEDICAL DECISION MAKING:       MEDICATIONS GIVEN:  Orders Placed This Encounter   Medications    metoprolol tartrate (LOPRESSOR) tablet 25 mg    enoxaparin (LOVENOX) injection 100 mg    atorvastatin (LIPITOR) tablet 20 mg    enoxaparin (LOVENOX) injection 100 mg       LABS / RADIOLOGY:     Labs Reviewed   CBC WITH AUTO DIFFERENTIAL - Abnormal; Notable for the following components:       Result Value    Monocytes 13 (*)     All other components within normal limits   TROPONIN - Abnormal; Notable for the following components:    Troponin, High Sensitivity 33 (*)     All other components within normal limits   COMPREHENSIVE METABOLIC PANEL W/ REFLEX TO MG FOR LOW K - Abnormal; Notable for the following components:    Glucose 129 (*)     Chloride 93 (*)     All other components within normal limits   TROPONIN - Abnormal; Notable for the following components:    Troponin, High Sensitivity 45 (*)     All other components within normal limits   TROPONIN - Abnormal; Notable for the following components:    Troponin, High Sensitivity 64 (*)     All other components within normal limits   D-DIMER, QUANTITATIVE   PROTIME-INR   APTT       XR CHEST PORTABLE    Result Date: 10/28/2021  EXAMINATION: ONE XRAY VIEW OF THE CHEST 10/28/2021 10:19 am COMPARISON: None. HISTORY: ORDERING SYSTEM PROVIDED HISTORY: CP TECHNOLOGIST PROVIDED HISTORY: CP FINDINGS: The lungs are without acute focal process. There is no effusion or pneumothorax. The cardiomediastinal silhouette is without acute process. The osseous structures are without acute process. No acute process. RECENT VITALS:     Temp: 98.1 °F (36.7 °C),  Pulse: 91, Resp: 18, BP: (!) 154/80, SpO2: 96 %    This patient is a 61 y.o. Female with chest pain, resolved at this time, rising trop, got full dose lovenox, patient wanting to go to [facility], awaiting bed, Told by vic likely will have bed later today. Patient updated. OUTSTANDING TASKS / RECOMMENDATIONS:    1. Transfer to [facility]    3:06 PM EDT  No beds at [facility], spoke with patient who is agreeable to transfer to [facility]. Spoke with ER and plan for transfer at this time. Accepted by. Dr Alicia Mckeon, DO, DO  Attending Emergency Physician  [address] [facility] ED        Mook Tobar DO  10/29/21 1010    1.  NSTEMI (non-ST elevated myocardial infarction) [facility])           Mook Tobar DO  10/29/21 7181

## 2021-10-30 LAB
ALBUMIN SERPL-MCNC: 3.8 G/DL (ref 3.5–5.2)
ALBUMIN/GLOBULIN RATIO: 1.6 (ref 1–2.5)
ALP BLD-CCNC: 83 U/L (ref 35–104)
ALT SERPL-CCNC: 15 U/L (ref 5–33)
ANION GAP SERPL CALCULATED.3IONS-SCNC: 14 MMOL/L (ref 9–17)
AST SERPL-CCNC: 21 U/L
BILIRUB SERPL-MCNC: 0.56 MG/DL (ref 0.3–1.2)
BNP INTERPRETATION: NORMAL
BUN BLDV-MCNC: 10 MG/DL (ref 8–23)
BUN/CREAT BLD: ABNORMAL (ref 9–20)
CALCIUM SERPL-MCNC: 9.3 MG/DL (ref 8.6–10.4)
CHLORIDE BLD-SCNC: 97 MMOL/L (ref 98–107)
CHOLESTEROL/HDL RATIO: 3.2
CHOLESTEROL: 182 MG/DL
CO2: 26 MMOL/L (ref 20–31)
CREAT SERPL-MCNC: 0.67 MG/DL (ref 0.5–0.9)
EKG ATRIAL RATE: 81 BPM
EKG ATRIAL RATE: 91 BPM
EKG P AXIS: 42 DEGREES
EKG P AXIS: 56 DEGREES
EKG P-R INTERVAL: 164 MS
EKG P-R INTERVAL: 174 MS
EKG Q-T INTERVAL: 382 MS
EKG Q-T INTERVAL: 404 MS
EKG QRS DURATION: 76 MS
EKG QRS DURATION: 78 MS
EKG QTC CALCULATION (BAZETT): 469 MS
EKG QTC CALCULATION (BAZETT): 469 MS
EKG R AXIS: 1 DEGREES
EKG R AXIS: 9 DEGREES
EKG T AXIS: 58 DEGREES
EKG T AXIS: 72 DEGREES
EKG VENTRICULAR RATE: 81 BPM
EKG VENTRICULAR RATE: 91 BPM
GFR AFRICAN AMERICAN: >60 ML/MIN
GFR NON-AFRICAN AMERICAN: >60 ML/MIN
GFR SERPL CREATININE-BSD FRML MDRD: ABNORMAL ML/MIN/{1.73_M2}
GFR SERPL CREATININE-BSD FRML MDRD: ABNORMAL ML/MIN/{1.73_M2}
GLUCOSE BLD-MCNC: 102 MG/DL (ref 70–99)
HCT VFR BLD CALC: 37.2 % (ref 36.3–47.1)
HDLC SERPL-MCNC: 57 MG/DL
HEMOGLOBIN: 12.5 G/DL (ref 11.9–15.1)
LDL CHOLESTEROL: 104 MG/DL (ref 0–130)
MAGNESIUM: 2.1 MG/DL (ref 1.6–2.6)
MCH RBC QN AUTO: 29.9 PG (ref 25.2–33.5)
MCHC RBC AUTO-ENTMCNC: 33.6 G/DL (ref 28.4–34.8)
MCV RBC AUTO: 89 FL (ref 82.6–102.9)
NRBC AUTOMATED: 0 PER 100 WBC
PARTIAL THROMBOPLASTIN TIME: 40.6 SEC (ref 20.5–30.5)
PARTIAL THROMBOPLASTIN TIME: 44.3 SEC (ref 20.5–30.5)
PARTIAL THROMBOPLASTIN TIME: 68 SEC (ref 20.5–30.5)
PARTIAL THROMBOPLASTIN TIME: 89.2 SEC (ref 20.5–30.5)
PDW BLD-RTO: 12.9 % (ref 11.8–14.4)
PLATELET # BLD: 301 K/UL (ref 138–453)
PMV BLD AUTO: 9.3 FL (ref 8.1–13.5)
POTASSIUM SERPL-SCNC: 3 MMOL/L (ref 3.7–5.3)
PRO-BNP: 112 PG/ML
RBC # BLD: 4.18 M/UL (ref 3.95–5.11)
SODIUM BLD-SCNC: 137 MMOL/L (ref 135–144)
TOTAL PROTEIN: 6.2 G/DL (ref 6.4–8.3)
TRIGL SERPL-MCNC: 103 MG/DL
TROPONIN INTERP: ABNORMAL
TROPONIN INTERP: ABNORMAL
TROPONIN T: ABNORMAL NG/ML
TROPONIN T: ABNORMAL NG/ML
TROPONIN, HIGH SENSITIVITY: 104 NG/L (ref 0–14)
TROPONIN, HIGH SENSITIVITY: 120 NG/L (ref 0–14)
VLDLC SERPL CALC-MCNC: NORMAL MG/DL (ref 1–30)
WBC # BLD: 7.7 K/UL (ref 3.5–11.3)

## 2021-10-30 PROCEDURE — 83880 ASSAY OF NATRIURETIC PEPTIDE: CPT

## 2021-10-30 PROCEDURE — 99232 SBSQ HOSP IP/OBS MODERATE 35: CPT | Performed by: INTERNAL MEDICINE

## 2021-10-30 PROCEDURE — 93005 ELECTROCARDIOGRAM TRACING: CPT | Performed by: NURSE PRACTITIONER

## 2021-10-30 PROCEDURE — 80061 LIPID PANEL: CPT

## 2021-10-30 PROCEDURE — 84484 ASSAY OF TROPONIN QUANT: CPT

## 2021-10-30 PROCEDURE — 2060000000 HC ICU INTERMEDIATE R&B

## 2021-10-30 PROCEDURE — 93010 ELECTROCARDIOGRAM REPORT: CPT | Performed by: INTERNAL MEDICINE

## 2021-10-30 PROCEDURE — 6370000000 HC RX 637 (ALT 250 FOR IP): Performed by: STUDENT IN AN ORGANIZED HEALTH CARE EDUCATION/TRAINING PROGRAM

## 2021-10-30 PROCEDURE — 36415 COLL VENOUS BLD VENIPUNCTURE: CPT

## 2021-10-30 PROCEDURE — 83735 ASSAY OF MAGNESIUM: CPT

## 2021-10-30 PROCEDURE — 6360000002 HC RX W HCPCS: Performed by: STUDENT IN AN ORGANIZED HEALTH CARE EDUCATION/TRAINING PROGRAM

## 2021-10-30 PROCEDURE — 6370000000 HC RX 637 (ALT 250 FOR IP): Performed by: INTERNAL MEDICINE

## 2021-10-30 PROCEDURE — 6370000000 HC RX 637 (ALT 250 FOR IP): Performed by: NURSE PRACTITIONER

## 2021-10-30 PROCEDURE — 6370000000 HC RX 637 (ALT 250 FOR IP)

## 2021-10-30 PROCEDURE — 85027 COMPLETE CBC AUTOMATED: CPT

## 2021-10-30 PROCEDURE — 85730 THROMBOPLASTIN TIME PARTIAL: CPT

## 2021-10-30 PROCEDURE — 80053 COMPREHEN METABOLIC PANEL: CPT

## 2021-10-30 RX ORDER — POTASSIUM CHLORIDE 20 MEQ/1
40 TABLET, EXTENDED RELEASE ORAL ONCE
Status: COMPLETED | OUTPATIENT
Start: 2021-10-30 | End: 2021-10-30

## 2021-10-30 RX ORDER — ASPIRIN 81 MG/1
81 TABLET, CHEWABLE ORAL DAILY
Status: DISCONTINUED | OUTPATIENT
Start: 2021-10-30 | End: 2021-11-02 | Stop reason: HOSPADM

## 2021-10-30 RX ORDER — CLOPIDOGREL BISULFATE 75 MG/1
75 TABLET ORAL DAILY
Status: DISCONTINUED | OUTPATIENT
Start: 2021-10-30 | End: 2021-11-01

## 2021-10-30 RX ORDER — POTASSIUM CHLORIDE 20 MEQ/1
40 TABLET, EXTENDED RELEASE ORAL ONCE
Status: DISCONTINUED | OUTPATIENT
Start: 2021-10-30 | End: 2021-11-02 | Stop reason: HOSPADM

## 2021-10-30 RX ORDER — AMLODIPINE BESYLATE 5 MG/1
5 TABLET ORAL DAILY
Status: DISCONTINUED | OUTPATIENT
Start: 2021-10-30 | End: 2021-11-02 | Stop reason: HOSPADM

## 2021-10-30 RX ORDER — ASPIRIN 81 MG/1
TABLET, CHEWABLE ORAL
Status: COMPLETED
Start: 2021-10-30 | End: 2021-10-30

## 2021-10-30 RX ADMIN — ASPIRIN: 81 TABLET ORAL at 11:15

## 2021-10-30 RX ADMIN — ASPIRIN 81 MG: 81 TABLET, CHEWABLE ORAL at 11:12

## 2021-10-30 RX ADMIN — CETIRIZINE HYDROCHLORIDE 10 MG: 10 TABLET ORAL at 11:06

## 2021-10-30 RX ADMIN — ATORVASTATIN CALCIUM 80 MG: 80 TABLET, FILM COATED ORAL at 20:31

## 2021-10-30 RX ADMIN — METOPROLOL TARTRATE 25 MG: 50 TABLET, FILM COATED ORAL at 20:31

## 2021-10-30 RX ADMIN — METOPROLOL TARTRATE 25 MG: 50 TABLET, FILM COATED ORAL at 11:06

## 2021-10-30 RX ADMIN — CITALOPRAM 10 MG: 10 TABLET, FILM COATED ORAL at 20:32

## 2021-10-30 RX ADMIN — POTASSIUM CHLORIDE 40 MEQ: 1500 TABLET, EXTENDED RELEASE ORAL at 05:51

## 2021-10-30 RX ADMIN — AMLODIPINE BESYLATE 5 MG: 5 TABLET ORAL at 11:06

## 2021-10-30 RX ADMIN — HEPARIN SODIUM 2000 UNITS: 1000 INJECTION INTRAVENOUS; SUBCUTANEOUS at 17:58

## 2021-10-30 RX ADMIN — CLOPIDOGREL 75 MG: 75 TABLET, FILM COATED ORAL at 11:05

## 2021-10-30 RX ADMIN — HEPARIN SODIUM AND DEXTROSE 14.4 UNITS/KG/HR: 10000; 5 INJECTION INTRAVENOUS at 17:59

## 2021-10-30 ASSESSMENT — PAIN SCALES - GENERAL
PAINLEVEL_OUTOF10: 0

## 2021-10-30 NOTE — ED NOTES
The following labs labeled with pt sticker and tubed to lab:     [] Blue     [x] Lavender   [] on ice  [x] Green/yellow  [] Green/black [] on ice  [x] Yellow  [] Red  [] Pink      [] COVID-19 swab    [] Rapid  [] PCR  [] Peds Viral Panel     [] Urine Sample  [] Pelvic Cultures  [] Blood Cultures            Pastor Justa RN  10/30/21 8838

## 2021-10-30 NOTE — PROGRESS NOTES
[facility]  Office: [address], DO, Quique Roe, DO, Eliud Smiley, DO, Mustapha Sifuentes Blood, DO, Marianela Gan MD, Timothy Dutton MD, Travis Del Rosario MD, Ashley Huitron MD, Lenora Gregorio MD, Carolyn Shi MD, Brittany Bishop MD, Imani Becker MD, Delmy Ford, DO, Tena Fuchs, DO, Shaw Koehler MD,  Armand Juarez, DO, Mary Kay Dickey MD, David Lobo MD, Analilia Freeman MD, Isauro Martini MD, Marisabel Gardner MD, Nargis Struass MD, Souleymane Nuñez CNP, [facility], CNP, Ghulam Armstrong, CNP, Jennifer , CNS, Ginny Travis, CNP, Azar Mercer, CNP, Griselda Bravo, CNP, eJssica Grande, CNP, Jeffrey Brownlee, CNP, Sulaiman Cain, CNP, Molly Go PA-C, Jill Sanchez, [facility], Dmitri Dang, CNP, Carmen Steele, CNP, Roman Samuel, CNP, Paul Quintanilla, CNP, Stella Cortez, CNP, Cassi Fiore, CNP, Heidi Acuna, [address] [place] [address]    Progress Note    10/30/2021    7:10 AM    Name:   Daniel Wiley  MRN:     [id number]     Acct:      [de-identified]   Room:   20/20   Day:  1  Admit Date:  10/29/2021  6:55 PM    PCP:   Bonny Barnes MD  Code Status:  Full Code    Subjective:     C/C:   Chief Complaint   Patient presents with    Chest Pain     transfer from [place], elevated trops      Interval History Status: not changed. Pt seen and examined at bedside. Patient states that she developed chest pain while working on her computer at home that lasted for approximately an hour and a half. Patient described as increasing pressure that radiated to her arms and jaw. Patient has a family history of coronary artery disease in her mom, Grandma. Denies any previous coronary disease. She has noted increasing fatigue especially when ambulating. Otherwise denies any tobacco use. Denies any history of diabetes. Currently, she denies any chest pain, fevers, chills. Brief History:      This is a 72-year-old female initially presented to the hospital for work-up of chest pain that started while she was working on a computer from home. Symptoms lasted approximately an hour. Patient was transferred here for cardiac catheterization with stenting plan for the Monday    Review of Systems:     Constitutional:  negative for chills, fevers, sweats  Respiratory:  negative for cough, dyspnea on exertion, shortness of breath, wheezing  Cardiovascular:  negative for chest pain, chest pressure/discomfort, lower extremity edema, palpitations  Gastrointestinal:  negative for abdominal pain, constipation, diarrhea, nausea, vomiting  Neurological:  negative for dizziness, headache    Medications: Allergies: Allergies   Allergen Reactions    Other Itching     Floxins, itching       Current Meds:   Scheduled Meds:    potassium chloride  40 mEq Oral Once    fluticasone  2 spray Each Nostril Daily    cetirizine  10 mg Oral Daily    citalopram  10 mg Oral Daily    hydroCHLOROthiazide  50 mg Oral Daily    sodium chloride flush  5-40 mL IntraVENous 2 times per day    metoprolol tartrate  25 mg Oral BID    atorvastatin  80 mg Oral Nightly    aspirin  325 mg Oral Daily     Continuous Infusions:    heparin (PORCINE) Infusion 10.4 Units/kg/hr (10/29/21 2007)    sodium chloride       PRN Meds: heparin (porcine), heparin (porcine), sodium chloride flush, sodium chloride, potassium chloride **OR** potassium alternative oral replacement **OR** potassium chloride, magnesium sulfate, ondansetron **OR** ondansetron, acetaminophen **OR** acetaminophen, magnesium hydroxide, nitroGLYCERIN, albuterol    Data:     Past Medical History:   has a past medical history of Asthma, Hyperlipidemia, Hypertension, and IBS (irritable bowel syndrome). Social History:   reports that she has never smoked. She has never used smokeless tobacco. She reports current alcohol use. She reports that she does not use drugs.      Family History:   Family History   Problem Relation Age of Onset    Cancer Mother         pancreatic    High Blood Pressure Mother     Diabetes Mother     High Blood Pressure Father     High Cholesterol Father     Alzheimer's Disease Father     Other Father         A fib    High Blood Pressure Sister     High Cholesterol Sister     Cancer Paternal Aunt         breast    Cancer Paternal Uncle         unknown    Diabetes Maternal Grandmother     Diabetes Paternal Grandfather     Heart Disease Other         fathers side       Vitals:  /66   Pulse 72   Temp 99.7 °F (37.6 °C) (Oral)   Resp 18   SpO2 98%   Temp (24hrs), Av.7 °F (37.6 °C), Min:99.7 °F (37.6 °C), Max:99.7 °F (37.6 °C)    No results for input(s): POCGLU in the last 72 hours. I/O (24Hr): No intake or output data in the 24 hours ending 10/30/21 0710    Labs:  Hematology:  Recent Labs     10/28/21  1003 10/29/21  2011 10/30/21  0433   WBC 8.6 7.9 7.7   RBC 4.50 4.21 4.18   HGB 13.6 12.7 12.5   HCT 39.6 37.3 37.2   MCV 88.0 88.6 89.0   MCH 30.2 30.2 29.9   MCHC 34.3 34.0 33.6   RDW 12.3 12.6 12.9    334 301   MPV 8.9 9.6 9.3   INR 1.0  --   --    DDIMER <0.27  --   --      Chemistry:  Recent Labs     10/28/21  1003 10/28/21  1234 10/28/21  1440 10/29/21  1913 10/30/21  0203 10/30/21  0433     --   --   --   --  137   K 3.7  --   --   --   --  3.0*   CL 93*  --   --   --   --  97*   CO2 28  --   --   --   --  26   GLUCOSE 129*  --   --   --   --  102*   BUN 13  --   --   --   --  10   CREATININE 0.80  --   --   --   --  0.67   MG  --   --   --   --   --  2.1   ANIONGAP 14  --   --   --   --  14   LABGLOM >60  --   --   --   --  >60   GFRAA >60  --   --   --   --  >60   CALCIUM 9.8  --   --   --   --  9.3   PROBNP  --   --   --   --   --  112   TROPHS 33*   < > 64* 91* 104*  --     < > = values in this interval not displayed.      Recent Labs     10/28/21  1003 10/30/21  0433   PROT 7.1 6.2*   LABALBU 4.4 3.8   AST 23 21   ALT 19 15   ALKPHOS 97 83   BILITOT 0.41 0.56   CHOL --  182   HDL  --  57   LDLCHOLESTEROL  --  104   CHOLHDLRATIO  --  3.2   TRIG  --  103   VLDL  --  NOT REPORTED     ABG:No results found for: POCPH, PHART, PH, POCPCO2, ZHX6NQB, PCO2, POCPO2, PO2ART, PO2, POCHCO3, MHC1WIK, HCO3, NBEA, PBEA, BEART, BE, THGBART, THB, YME4HHR, UOEK3NJM, W0NWIQLV, O2SAT, FIO2  No results found for: SPECIAL  No results found for: CULTURE    Radiology:  CTA CHEST W WO CONTRAST    Result Date: 10/29/2021  No acute findings with no evidence of aortic dissection or pulmonary embolism. The thoracic aorta is normal in caliber with mild atherosclerosis. Mild bibasilar atelectasis in the lungs. XR CHEST PORTABLE    Result Date: 10/28/2021  No acute process. Physical Examination:        General appearance:  alert, cooperative and no distress  Mental Status:  oriented to person, place and time and normal affect  Lungs:  clear to auscultation bilaterally, normal effort  Heart:  regular rate and rhythm, no murmur  Abdomen:  soft, nontender, nondistended, normal bowel sounds, no masses, hepatomegaly, splenomegaly  Extremities:  no edema, redness, tenderness in the calves  Skin:  no gross lesions, rashes, induration    Assessment:        Hospital Problems         Last Modified POA    * (Principal) NSTEMI (non-ST elevated myocardial infarction) ([facility] [address] 10/29/2021 Yes    HTN (hypertension) 10/29/2021 Yes    Mild intermittent asthma without complication 66/64/3201 Yes          Plan:        1. NSTEMI: Troponin 64->91->104. ECG at out facility shows NSR with non specific ST/T wave changes. Pt currently chest pain free. Check echocardiogram. Continue ASA, Plavix, Lipitor 80, Lopressor 25 mg QHS. Plan for cardiac cath Monday. 2. Acute Hypokalemia: replace potassium. 3. Uncontrolled Hypertension: Continue BB. Stop HCTZ, start ACE prior to d/c pending on Finding/EF. 4. Depression: Continue Celexa 10 mg daily  5. Allergic rhinitis: Flonase, Zyrtec  6.  Asthma without exacerbation: continue nebulizer treatment. 7. Obesity BMI 34: recommend weight loss and lifestyle modifications. 8. Labs, imaging, ECG reviewed  9.  PT/OT    Teresa Tate DO  10/30/2021  7:10 AM

## 2021-10-30 NOTE — ED NOTES
Pt up to the bathroom. Gait steady, no dizziness or lightheadedness present.  Will continue to monitor      Alessandra Oscar RN  10/30/21 3798

## 2021-10-30 NOTE — ED NOTES
The following labs labeled with pt sticker and tubed to lab:     [x] Blue     [x] Lavender   [] on ice  [x] Green/yellow  [] Green/black [] on ice  [] Yellow  [] Red  [] Pink      [] COVID-19 swab    [] Rapid  [] PCR  [] Peds Viral Panel     [] Urine Sample  [] Pelvic Cultures  [] Blood Cultures            Radha Govea RN  10/29/21 2051

## 2021-10-30 NOTE — PROGRESS NOTES
"Admission Medication Reconciliation - Pharmacy Consult Note    The home medication history was taken by Michaela Hillman, Pharmacy Technician. Based on information gathered and subsequent review by the clinical pharmacist, the items below may need attention.     You may go to "Admission" then "Reconcile Home Medications" tabs to review and/or act upon these items.     Potentially problematic discrepancies with current MAR  o Patient IS taking the following which was not ordered upon admit  o Aspirin EC 81 mg PO daily   o Dorzolamide-timolol 2-0.5% ophthalmic soln. 1 gtt OU daily  o Fluticasone-vilanterol 100-25 mcg/dose 1 puff into the lungs daily  o Latanoprost 0.005% ophthalmic soln. 1 gtt OU qHS  o Myrbetriq Tb24 50 mg PO qAM  o Regorafenib (Stivarga) start 2 tablets (80mg) for 1 week and increase to 3 tablets (120mg) week 2, 4 tablets (160mg) week 3 and beyond.    Potential issues to be addressed PRIOR TO DISCHARGE  o Patient request refills for breo    Please address this information as you see fit.  Feel free to contact us if you have any questions or require assistance.    Yris Westfall, PharmD, BCPS  [id number]     PTA Medications   Medication Sig    albuterol-ipratropium (DUO-NEB) 2.5 mg-0.5 mg/3 mL nebulizer solution USE 1 VIAL VIA NEBULIZER EVERY 4 HOURS AS NEEDED FOR WHEEZING; RESCUE    aspirin (ECOTRIN) 81 MG EC tablet Take 81 mg by mouth once daily.    atorvastatin (LIPITOR) 40 MG tablet TAKE 1 TABLET BY MOUTH EVERY DAY    dorzolamide-timolol 2-0.5% (COSOPT) 22.3-6.8 mg/mL ophthalmic solution INSTILL 1 GTT INTO BOTH EYES ONCE DAILY    latanoprost 0.005 % ophthalmic solution Place 1 drop into both eyes every evening.    lisinopril (PRINIVIL,ZESTRIL) 20 MG tablet TAKE 1 TABLET BY MOUTH EVERY DAY    metoprolol tartrate (LOPRESSOR) 25 MG tablet Take 0.5 tablets (12.5 mg total) by mouth 2 (two) times daily.    multivitamin capsule Take 1 capsule by mouth every morning.     MYRBETRIQ 50 mg Tb24 TAKE 1 " Deysi Zelaya Assessment complete. [facility] [I21.4] . Vitals:    10/30/21 0601   BP:    Pulse: 72   Resp: 18   Temp:    SpO2: 98%   . Patients home meds are   Prior to Admission medications    Medication Sig Start Date End Date Taking?  Authorizing Provider   beclomethasone (QVAR) 80 MCG/ACT inhaler Inhale 1 puff into the lungs 2 times daily    Historical Provider, MD   citalopram (CELEXA) 10 MG tablet Take 10 mg by mouth daily    Historical Provider, MD   atorvastatin (LIPITOR) 20 MG tablet Take 20 mg by mouth daily    Historical Provider, MD   hydrochlorothiazide (HYDRODIURIL) 50 MG tablet Take 50 mg by mouth daily    Historical Provider, MD   dicyclomine (BENTYL) 20 MG tablet Take 20 mg by mouth 1/2 tab 4 times a week    Historical Provider, MD   Triamcinolone Acetonide (NASACORT AQ NA) by Nasal route 2 times daily 1 spray    Historical Provider, MD   albuterol sulfate  (90 BASE) MCG/ACT inhaler Inhale 2 puffs into the lungs as needed for Wheezing    Historical Provider, MD   Loratadine (CLARITIN) 10 MG CAPS Take 10 mg by mouth daily     Historical Provider, MD   Probiotic Product (ALIGN PO) Take 1 capsule by mouth daily     Historical Provider, MD   .  Recent Surgical History: None = 0     Assessment       RR 18  Breath Sounds: clear/dim      · Bronchodilator assessment at level  1  · Hyperinflation assessment at level   · Secretion Management assessment at level    ·   · []    Bronchodilator Assessment  BRONCHODILATOR ASSESSMENT SCORE  Score 0 1 2 3 4 5   Breath Sounds   []  Patient Baseline [x]  No Wheeze good aeration []  Faint, scattered wheezing, good aeration []  Expiratory Wheezing and or moderately diminished []  Insp/Exp wheeze and/or very diminished []  Insp/Exp and/ or marked distress   Respiratory Rate   []  Patient Baseline [x]  Less than 20 []  Less than 20 []  20-25 []  Greater than 25 []  Greater than 25   Peak flow % of Pred or PB []  NA   []  Greater than 90%  []  81-90% []  71-80% []  Less than or equal to 70%  or unable to perform []  Unable due to Respiratory Distress   Dyspnea re []  Patient Baseline [x]  No SOB []  No SOB []  SOB on exertion []  SOB min activity []  At rest/acute   e FEV% Predicted       []  NA []  Above 69%  []  Unable []  Above 60-69%  []  Unable []  Above 50-59%  []  Unable []  Above 35-49%  []  Unable []  Less than 35%  []  Unable          The Respiratory Therapy Department completed the Respiratory Care evaluation. No therapy is indicated at this time. The reason therapy is not indicated is due to the following:     [] Patient does not meet indications for therapy. [x] Patient has returned to their home regimen.    [] Patient frequency has changed to prn, nursing to assess and call if needed  . Respiratory Care will not see this patient further unless otherwise requested. Please call the respiratory care charge therapist with questions or concerns at extension [phone].  Thank you for using the Respiratory Therapy Protocol Program.    Katiana Fernández RCP   6:12 AM TABLET BY MOUTH EVERY MORNING    omeprazole (PRILOSEC) 20 MG capsule TAKE 1 CAPSULE(20 MG) BY MOUTH EVERY DAY ON AN EMPTY STOMACH    oxyCODONE (ROXICODONE) 5 MG immediate release tablet Take 1 tablet (5 mg total) by mouth every 6 (six) hours as needed for Pain.    potassium chloride SA (K-DUR,KLOR-CON) 20 MEQ tablet TAKE 2 TABLETS(40 MEQ) BY MOUTH TWICE DAILY    regorafenib (STIVARGA) 40 mg Tab Take 4 tablets (160 mg) by mouth once daily On days 1-21 of 28 day cycle.  Start  2 tablets (80mg) for 1 week and increase to 3 tablets (120mg) week 2, 4 tablets (160mg) week 3 and beyond.    fluticasone-vilanterol (BREO) 100-25 mcg/dose diskus inhaler Inhale 1 puff into the lungs once daily. Controller    tiotropium (SPIRIVA) 18 mcg inhalation capsule Inhale 1 capsule (18 mcg total) into the lungs once daily. Controller      .    .

## 2021-10-30 NOTE — ED NOTES
The following labs labeled with pt sticker and tubed to lab:     [x] Blue     [] Lavender   [] on ice  [x] Green/yellow  [] Green/black [] on ice  [] Yellow  [] Red  [] Pink      [] COVID-19 swab    [] Rapid  [] PCR  [] Peds Viral Panel     [] Urine Sample  [] Pelvic Cultures  [] Blood Cultures            Radha Govea RN  10/30/21 9342 [facility] [email or web address] and sign in to your [employer] account. Enter V258 in the [employer] [facility] ([facility]). 

## 2021-10-30 NOTE — ED NOTES
Pt transfer from [place] ED. Pt went into ED with c/o chest pain that radiated to arms. Per EMS, pt's trops increased over 24 hours.  Pt currently denies chest pain, sob, n/v. Pt a/o, ambulated from stretcher to stretcher, RR even and non labored, hooked to monitor, call light in reach          Lupe Valles RN  10/29/21 2053

## 2021-10-30 NOTE — CARE COORDINATION
Case Management Initial Discharge Plan  [address],             Met with:patient or spouse/SO to discuss discharge plans. Information verified: address, contacts, phone number, , insurance Yes  Insurance Provider: [address] [place] [address]    Emergency Contact/Next of Kin name & number: Cheikh Anthony [phone]  Who are involved in patient's support system?     PCP: Rehan Parson MD  Date of last visit:       Discharge Planning    Living Arrangements:  Spouse/Significant Other     Home has 1 story  2 stairs to climb to get into front door     Patient able to perform ADL's:Independent    Current Services (outpatient & in home) none  DME equipment: none  DME provider: n/a    Is patient receiving oral anticoagulation therapy? No    If indicated:   Physician managing anticoagulation treatment:   Where does patient obtain lab work for ATC treatment? Potential Assistance Needed:  Other (Comment) ([facility])    Patient agreeable to home care: No  Foster of choice provided:  n/a    Prior SNF/Rehab Placement and Facility: none  Agreeable to SNF/Rehab: No  Foster of choice provided: n/a     Evaluation: no    Expected Discharge date:  21    Patient expects to be discharged to: If home: is the family and/or caregiver wiling & able to provide support at home? yes  Who will be providing this support? spouse*    Follow Up Appointment: Best Day/ Time: Monday AM    Transportation provider:   Transportation arrangements needed for discharge: No    Readmission Risk              Risk of Unplanned Readmission:  12             Does patient have a readmission risk score greater than 14?: No  If yes, follow-up appointment must be made within 7 days of discharge.      Goals of Care: Chest pain resolving, troponins decreasing      Educated pt and  on transitional options, provided freedom of choice and are agreeable with plan      Discharge Plan: Home with spouse          Electronically signed by Jorge Luis Sams RN on 10/30/21 at 5:49 PM EDT

## 2021-10-30 NOTE — ED NOTES
Lab called with critical trop of 104. On call NP [employer] notified via perfect serve.       Sergey Aleman RN  10/30/21 6965

## 2021-10-30 NOTE — CONSULTS
[facility]    Inpatient Consultation Note               Today's Date: 10/30/2021  Patient Name: Gabriel Jones  Date of admission: 10/29/2021  6:55 PM  Patient's age: 61 y.o., 1955  Admission Dx: Elevated troponin [R77.8]  NSTEMI (non-ST elevated myocardial infarction) [facility]) [I21.4]    Reason for  Consult:  NSTEMI    Requesting Physician: Masood Hammond MD    CHIEF COMPLAINT:     Chief Complaint   Patient presents with    Chest Pain     transfer from [facility]        History Obtained From:  Patient, Electronic medical record. HISTORY OF PRESENT ILLNESS:      The patient is a 61 y.o. female who is admitted to the hospital for NSTEMI. She has history of essential hypertension and hyperlipidemia. She presented with chest pain. On admission troponins are trending up suggesting nontype 1 pattern. During my evaluation patient is completely chest pain-free. She denies any shortness of breath. No nausea or vomiting. No diaphoresis. No palpitation or loss of consciousness. EKG normal sinus rhythm. ST depression in the inferior leads. Past Medical History:   has a past medical history of Asthma, Hyperlipidemia, Hypertension, and IBS (irritable bowel syndrome). Past Surgical History:   has a past surgical history that includes Colonoscopy (05/30/2017); pr colon ca scrn not hi rsk ind (N/A, 5/30/2017); and Hemorrhoid surgery. Home Medications:    Prior to Admission medications    Medication Sig Start Date End Date Taking?  Authorizing Provider   beclomethasone (QVAR) 80 MCG/ACT inhaler Inhale 1 puff into the lungs 2 times daily    Historical Provider, MD   citalopram (CELEXA) 10 MG tablet Take 10 mg by mouth daily    Historical Provider, MD   atorvastatin (LIPITOR) 20 MG tablet Take 20 mg by mouth daily    Historical Provider, MD   hydrochlorothiazide (HYDRODIURIL) 50 MG tablet Take 50 mg by mouth daily    Historical Provider, MD   dicyclomine (BENTYL) 20 MG tablet Take 20 mg by mouth 1/2 tab 4 times a week    Historical Provider, MD   Triamcinolone Acetonide (NASACORT AQ NA) by Nasal route 2 times daily 1 spray    Historical Provider, MD   albuterol sulfate  (90 BASE) MCG/ACT inhaler Inhale 2 puffs into the lungs as needed for Wheezing    Historical Provider, MD   Loratadine (CLARITIN) 10 MG CAPS Take 10 mg by mouth daily     Historical Provider, MD   Probiotic Product (ALIGN PO) Take 1 capsule by mouth daily     Historical Provider, MD        Current Facility-Administered Medications: potassium chloride (KLOR-CON M) extended release tablet 40 mEq, 40 mEq, Oral, Once  aspirin chewable tablet 81 mg, 81 mg, Oral, Daily  clopidogrel (PLAVIX) tablet 75 mg, 75 mg, Oral, Daily  fluticasone (FLONASE) 50 MCG/ACT nasal spray 2 spray, 2 spray, Each Nostril, Daily  cetirizine (ZYRTEC) tablet 10 mg, 10 mg, Oral, Daily  heparin (porcine) injection 4,000 Units, 4,000 Units, IntraVENous, PRN  heparin (porcine) injection 2,000 Units, 2,000 Units, IntraVENous, PRN  heparin 25,000 units in dextrose 5% 250 mL (premix) infusion, 5-30 Units/kg/hr, IntraVENous, Continuous  citalopram (CELEXA) tablet 10 mg, 10 mg, Oral, Daily  sodium chloride flush 0.9 % injection 5-40 mL, 5-40 mL, IntraVENous, 2 times per day  sodium chloride flush 0.9 % injection 10 mL, 10 mL, IntraVENous, PRN  0.9 % sodium chloride infusion, 25 mL, IntraVENous, PRN  potassium chloride (KLOR-CON M) extended release tablet 40 mEq, 40 mEq, Oral, PRN **OR** potassium bicarbonate (K-LYTE) disintegrating tablet 50 mEq, 50 mEq, Oral, PRN **OR** potassium chloride 10 mEq/100 mL IVPB (Peripheral Line), 10 mEq, IntraVENous, PRN  magnesium sulfate 1000 mg in dextrose 5% 100 mL IVPB, 1,000 mg, IntraVENous, PRN  ondansetron (ZOFRAN-ODT) disintegrating tablet 4 mg, 4 mg, Oral, Q8H PRN **OR** ondansetron (ZOFRAN) injection 4 mg, 4 mg, IntraVENous, Q6H PRN  acetaminophen (TYLENOL) tablet 650 mg, 650 mg, Oral, Q6H PRN **OR** present  Extremities:   No Cyanosis or Clubbing   Lower extremity edema: No  Neurological:  Alert and oriented. Moves all extremities well    DATA:    Diagnostics:      ECHO:    Pending  Labs:     CBC:   Recent Labs     10/29/21  2011 10/30/21  0433   WBC 7.9 7.7   HGB 12.7 12.5   HCT 37.3 37.2    301     BMP:   Recent Labs     10/28/21  1003 10/30/21  0433    137   K 3.7 3.0*   CO2 28 26   BUN 13 10   CREATININE 0.80 0.67   LABGLOM >60 >60   GLUCOSE 129* 102*     Pro-BNP:    Recent Labs     10/30/21  0433   PROBNP 112     Recent Labs     10/28/21  1003   PROTIME 13.2   INR 1.0     APTT:  Recent Labs     10/30/21  0203 10/30/21  0747   APTT 68.0* 40.6*     Recent Labs     10/29/21  1913 10/30/21  0203 10/30/21  0641   TROPONINT NOT REPORTED NOT REPORTED NOT REPORTED       FASTING LIPID PANEL:  Lab Results   Component Value Date    HDL 57 10/30/2021    TRIG 103 10/30/2021     LIVER PROFILE:  Recent Labs     10/28/21  1003 10/30/21  0433   AST 23 21   ALT 19 15   LABALBU 4.4 3.8         Patient's Active Problem List  Principal Problem:    NSTEMI (non-ST elevated myocardial infarction) ([facility] [address]  Active Problems:    HTN (hypertension)    Mild intermittent asthma without complication  Resolved Problems:    * No resolved hospital problems. *        IMPRESSION:      NSTEMI type 1  Essential hypertension  Hyperlipidemia      RECOMMENDATIONS:  NSTEMI - To cath lab monday. Currently chest pain free and hemodynamically stable. Further recommendations post cath. Optimize medical management until cath. Statin, Asprin, heparin gtt and Beta Blocker. Aggressive risk factor modification (Htn, Lipids, Weight, Diabetes). 2.   Nitro gtt if any chest pain      Thank you for allowing us to participate in the care of Raul Lolyd.. If you have any questions or concerns, please do not hesitate to contact us. Discussed with patient and spouse and Nurse. Chato Jose MD, M.D.   Fellow, Cardiovascular Diseases [address] [facility]        Please note that part of this chart were generated using voice recognition  dictation software. Although every effort was made to ensure the accuracy of this automated transcription, some errors in transcription may have occurred. I reviewed the patient history personally, and examined by me during the visit. I have reviewed the H&P/Consult / Progress note as completed, and made appropriate changes to the patient exam and treatment plans.       I have reviewed the case in details including physical exam and treatment plan with resident / fellow / NP    Patient treatment plan was explained to patient, correction in notes was made as appropriate, and discussed final arrangement based on  my evaluation and exam.    Additional Recommendations:      Jennifer Yoo MD  [facility]

## 2021-10-30 NOTE — ED NOTES
Perfect served cardiology fellow Reuben Guerrier re: [address] [place] [address], [address] [place] [address]  10/30/21 [id number]

## 2021-10-31 LAB
ABSOLUTE EOS #: 0.09 K/UL (ref 0–0.44)
ABSOLUTE IMMATURE GRANULOCYTE: <0.03 K/UL (ref 0–0.3)
ABSOLUTE LYMPH #: 2.71 K/UL (ref 1.1–3.7)
ABSOLUTE MONO #: 0.79 K/UL (ref 0.1–1.2)
ALBUMIN SERPL-MCNC: 3.5 G/DL (ref 3.5–5.2)
ALBUMIN/GLOBULIN RATIO: 1.3 (ref 1–2.5)
ALP BLD-CCNC: 79 U/L (ref 35–104)
ALT SERPL-CCNC: 17 U/L (ref 5–33)
ANION GAP SERPL CALCULATED.3IONS-SCNC: 11 MMOL/L (ref 9–17)
AST SERPL-CCNC: 24 U/L
BASOPHILS # BLD: 1 % (ref 0–2)
BASOPHILS ABSOLUTE: 0.04 K/UL (ref 0–0.2)
BILIRUB SERPL-MCNC: 0.57 MG/DL (ref 0.3–1.2)
BUN BLDV-MCNC: 12 MG/DL (ref 8–23)
BUN/CREAT BLD: ABNORMAL (ref 9–20)
CALCIUM SERPL-MCNC: 8.8 MG/DL (ref 8.6–10.4)
CHLORIDE BLD-SCNC: 100 MMOL/L (ref 98–107)
CO2: 25 MMOL/L (ref 20–31)
CREAT SERPL-MCNC: 0.71 MG/DL (ref 0.5–0.9)
DIFFERENTIAL TYPE: NORMAL
EOSINOPHILS RELATIVE PERCENT: 1 % (ref 1–4)
GFR AFRICAN AMERICAN: >60 ML/MIN
GFR NON-AFRICAN AMERICAN: >60 ML/MIN
GFR SERPL CREATININE-BSD FRML MDRD: ABNORMAL ML/MIN/{1.73_M2}
GFR SERPL CREATININE-BSD FRML MDRD: ABNORMAL ML/MIN/{1.73_M2}
GLUCOSE BLD-MCNC: 110 MG/DL (ref 65–105)
GLUCOSE BLD-MCNC: 92 MG/DL (ref 65–105)
GLUCOSE BLD-MCNC: 93 MG/DL (ref 70–99)
HCT VFR BLD CALC: 37 % (ref 36.3–47.1)
HEMOGLOBIN: 12.1 G/DL (ref 11.9–15.1)
IMMATURE GRANULOCYTES: 0 %
LYMPHOCYTES # BLD: 39 % (ref 24–43)
MCH RBC QN AUTO: 30.3 PG (ref 25.2–33.5)
MCHC RBC AUTO-ENTMCNC: 32.7 G/DL (ref 28.4–34.8)
MCV RBC AUTO: 92.5 FL (ref 82.6–102.9)
MONOCYTES # BLD: 11 % (ref 3–12)
NRBC AUTOMATED: 0 PER 100 WBC
PARTIAL THROMBOPLASTIN TIME: 63.7 SEC (ref 20.5–30.5)
PARTIAL THROMBOPLASTIN TIME: 69.5 SEC (ref 20.5–30.5)
PDW BLD-RTO: 12.7 % (ref 11.8–14.4)
PLATELET # BLD: 299 K/UL (ref 138–453)
PLATELET ESTIMATE: NORMAL
PMV BLD AUTO: 9.5 FL (ref 8.1–13.5)
POTASSIUM SERPL-SCNC: 3.6 MMOL/L (ref 3.7–5.3)
RBC # BLD: 4 M/UL (ref 3.95–5.11)
RBC # BLD: NORMAL 10*6/UL
SEG NEUTROPHILS: 48 % (ref 36–65)
SEGMENTED NEUTROPHILS ABSOLUTE COUNT: 3.31 K/UL (ref 1.5–8.1)
SODIUM BLD-SCNC: 136 MMOL/L (ref 135–144)
TOTAL PROTEIN: 6.1 G/DL (ref 6.4–8.3)
WBC # BLD: 7 K/UL (ref 3.5–11.3)
WBC # BLD: NORMAL 10*3/UL

## 2021-10-31 PROCEDURE — 6370000000 HC RX 637 (ALT 250 FOR IP): Performed by: NURSE PRACTITIONER

## 2021-10-31 PROCEDURE — 6370000000 HC RX 637 (ALT 250 FOR IP): Performed by: STUDENT IN AN ORGANIZED HEALTH CARE EDUCATION/TRAINING PROGRAM

## 2021-10-31 PROCEDURE — 82947 ASSAY GLUCOSE BLOOD QUANT: CPT

## 2021-10-31 PROCEDURE — 6360000002 HC RX W HCPCS: Performed by: STUDENT IN AN ORGANIZED HEALTH CARE EDUCATION/TRAINING PROGRAM

## 2021-10-31 PROCEDURE — 6370000000 HC RX 637 (ALT 250 FOR IP): Performed by: INTERNAL MEDICINE

## 2021-10-31 PROCEDURE — 99232 SBSQ HOSP IP/OBS MODERATE 35: CPT | Performed by: INTERNAL MEDICINE

## 2021-10-31 PROCEDURE — 85730 THROMBOPLASTIN TIME PARTIAL: CPT

## 2021-10-31 PROCEDURE — 80053 COMPREHEN METABOLIC PANEL: CPT

## 2021-10-31 PROCEDURE — 36415 COLL VENOUS BLD VENIPUNCTURE: CPT

## 2021-10-31 PROCEDURE — 2060000000 HC ICU INTERMEDIATE R&B

## 2021-10-31 PROCEDURE — 85025 COMPLETE CBC W/AUTO DIFF WBC: CPT

## 2021-10-31 RX ORDER — POTASSIUM CHLORIDE 20 MEQ/1
40 TABLET, EXTENDED RELEASE ORAL ONCE
Status: COMPLETED | OUTPATIENT
Start: 2021-10-31 | End: 2021-10-31

## 2021-10-31 RX ADMIN — HEPARIN SODIUM AND DEXTROSE 12.44 UNITS/KG/HR: 10000; 5 INJECTION INTRAVENOUS at 13:43

## 2021-10-31 RX ADMIN — AMLODIPINE BESYLATE 5 MG: 5 TABLET ORAL at 08:41

## 2021-10-31 RX ADMIN — CETIRIZINE HYDROCHLORIDE 10 MG: 10 TABLET ORAL at 08:42

## 2021-10-31 RX ADMIN — ASPIRIN 81 MG: 81 TABLET, CHEWABLE ORAL at 08:42

## 2021-10-31 RX ADMIN — FLUTICASONE PROPIONATE 2 SPRAY: 50 SPRAY, METERED NASAL at 08:43

## 2021-10-31 RX ADMIN — METOPROLOL TARTRATE 25 MG: 50 TABLET, FILM COATED ORAL at 21:32

## 2021-10-31 RX ADMIN — CITALOPRAM 10 MG: 10 TABLET, FILM COATED ORAL at 08:44

## 2021-10-31 RX ADMIN — METOPROLOL TARTRATE 25 MG: 50 TABLET, FILM COATED ORAL at 08:41

## 2021-10-31 RX ADMIN — CLOPIDOGREL 75 MG: 75 TABLET, FILM COATED ORAL at 08:42

## 2021-10-31 RX ADMIN — POTASSIUM CHLORIDE 40 MEQ: 1500 TABLET, EXTENDED RELEASE ORAL at 08:45

## 2021-10-31 RX ADMIN — ATORVASTATIN CALCIUM 80 MG: 80 TABLET, FILM COATED ORAL at 21:32

## 2021-10-31 ASSESSMENT — PAIN SCALES - GENERAL
PAINLEVEL_OUTOF10: 0

## 2021-10-31 NOTE — PROGRESS NOTES
[facility]  Progress Note                   Date:   10/31/2021  Patient name: Felicia Beard  Date of admission:  10/29/2021  6:55 PM  MRN:   [id number]  YOB: 1955  PCP: Benedicto Sterling MD    Reason for Admission: Elevated troponin [R77.8]  NSTEMI (non-ST elevated myocardial infarction) ([address] [I21.4]    Subjective:       Clinical Changes /Abnormalities: Seen & examined in room. No acute CV issues/concerns overnight. Labs, vitals, & tele reviewed. Review of Systems    Medications:   Scheduled Meds:   potassium chloride  40 mEq Oral Once    aspirin  81 mg Oral Daily    clopidogrel  75 mg Oral Daily    amLODIPine  5 mg Oral Daily    fluticasone  2 spray Each Nostril Daily    cetirizine  10 mg Oral Daily    citalopram  10 mg Oral Daily    sodium chloride flush  5-40 mL IntraVENous 2 times per day    metoprolol tartrate  25 mg Oral BID    atorvastatin  80 mg Oral Nightly     Continuous Infusions:   heparin (PORCINE) Infusion 12.4 Units/kg/hr (10/30/21 2310)    sodium chloride       CBC:   Recent Labs     10/29/21  2011 10/30/21  0433 10/31/21  0624   WBC 7.9 7.7 7.0   HGB 12.7 12.5 12.1    301 299     BMP:    Recent Labs     10/28/21  1003 10/30/21  0433 10/31/21  0624    137 136   K 3.7 3.0* 3.6*   CL 93* 97* 100   CO2 28 26 25   BUN 13 10 12   CREATININE 0.80 0.67 0.71   GLUCOSE 129* 102* 93     Hepatic:  Recent Labs     10/28/21  1003 10/30/21  0433 10/31/21  0624   AST 23 21 24   ALT 19 15 17   BILITOT 0.41 0.56 0.57   ALKPHOS 97 83 79     Troponin:   Recent Labs     10/29/21  1913 10/30/21  0203 10/30/21  0641   TROPHS 91* 104* 120*     BNP: No results for input(s): BNP in the last 72 hours.   Lipids:   Recent Labs     10/30/21  0433   CHOL 182   HDL 57     INR:   Recent Labs     10/28/21  1003   INR 1.0       Objective:   Vitals: BP (!) 128/56   Pulse 81   Temp 99.2 °F (37.3 °C) (Oral)   Resp 20   Ht 5' 5\" (1.651 m)   Wt 205 lb 11 oz (93.3 kg) SpO2 94%   BMI 34.23 kg/m²   General appearance: alert and cooperative with exam  HEENT: Head: Normocephalic, no lesions, without obvious abnormality. Neck:no JVD, trachea midline, no adenopathy  Lungs: Clear to auscultation  Heart: Regular rate and rhythm, s1/s2 auscultated, no murmurs  Abdomen: soft, non-tender, bowel sounds active  Extremities: no edema  Neurologic: not done        Assessment / Acute Cardiac Problems:   1. NSTEMI  2. HTN    Patient Active Problem List:     NSTEMI (non-ST elevated myocardial infarction) ([facility] [address]     HTN (hypertension)     Mild intermittent asthma without complication      Plan of Treatment:   1. Stable and free of chest pain. Keep NPO after midnight for cath in AM. Continue Heparin gtt along with PO ASA, statin, BB, & Plavix   2. Replace K+. Keep K >4 and Mg > 2  3. I have discussed risks (including but not limited to vascular injury, infection, hematoma, contrast induced kidney dysfunction, CVA and MI), benefits, alternatives in detail. All questions answered. Patient agrees to proceed.        Electronically signed by NATAN Guillermo CNP on 10/31/2021 at 8:23 [facility].  [phone]

## 2021-10-31 NOTE — PROGRESS NOTES
wheezing  Cardiovascular:  negative for chest pain, chest pressure/discomfort, lower extremity edema, palpitations  Gastrointestinal:  negative for abdominal pain, constipation, diarrhea, nausea, vomiting  Neurological:  negative for dizziness, headache    Medications: Allergies: Allergies   Allergen Reactions    Other Itching     Floxins, itching       Current Meds:   Scheduled Meds:    potassium chloride  40 mEq Oral Once    aspirin  81 mg Oral Daily    clopidogrel  75 mg Oral Daily    amLODIPine  5 mg Oral Daily    fluticasone  2 spray Each Nostril Daily    cetirizine  10 mg Oral Daily    citalopram  10 mg Oral Daily    sodium chloride flush  5-40 mL IntraVENous 2 times per day    metoprolol tartrate  25 mg Oral BID    atorvastatin  80 mg Oral Nightly     Continuous Infusions:    heparin (PORCINE) Infusion 12.435 Units/kg/hr (10/31/21 1343)    sodium chloride       PRN Meds: heparin (porcine), heparin (porcine), sodium chloride flush, sodium chloride, potassium chloride **OR** potassium alternative oral replacement **OR** potassium chloride, magnesium sulfate, ondansetron **OR** ondansetron, acetaminophen **OR** acetaminophen, magnesium hydroxide, nitroGLYCERIN, albuterol    Data:     Past Medical History:   has a past medical history of Asthma, Hyperlipidemia, Hypertension, and IBS (irritable bowel syndrome). Social History:   reports that she has never smoked. She has never used smokeless tobacco. She reports current alcohol use. She reports that she does not use drugs.      Family History:   Family History   Problem Relation Age of Onset    Cancer Mother         pancreatic    High Blood Pressure Mother     Diabetes Mother     High Blood Pressure Father     High Cholesterol Father     Alzheimer's Disease Father     Other Father         A fib    High Blood Pressure Sister     High Cholesterol Sister     Cancer Paternal Aunt         breast    Cancer Paternal Uncle         unknown  Diabetes Maternal Grandmother     Diabetes Paternal Grandfather     Heart Disease Other         fathers side       Vitals:  BP (!) 121/58   Pulse 73   Temp 99.7 °F (37.6 °C) (Oral)   Resp 14   Ht 5' 5\" (1.651 m)   Wt 205 lb 11 oz (93.3 kg)   SpO2 95%   BMI 34.23 kg/m²   Temp (24hrs), Av.8 °F (37.1 °C), Min:97.9 °F (36.6 °C), Max:99.7 °F (37.6 °C)    Recent Labs     10/31/21  0800 10/31/21  1149   POCGLU 92 110*       I/O (24Hr):     Intake/Output Summary (Last 24 hours) at 10/31/2021 1812  Last data filed at 10/31/2021 181  Gross per 24 hour   Intake 1318 ml   Output 1900 ml   Net -582 ml       Labs:  Hematology:  Recent Labs     10/29/21  2011 10/30/21  0433 10/31/21  0624   WBC 7.9 7.7 7.0   RBC 4.21 4.18 4.00   HGB 12.7 12.5 12.1   HCT 37.3 37.2 37.0   MCV 88.6 89.0 92.5   MCH 30.2 29.9 30.3   MCHC 34.0 33.6 32.7   RDW 12.6 12.9 12.7    301 299   MPV 9.6 9.3 9.5     Chemistry:  Recent Labs     10/29/21  1913 10/30/21  0203 10/30/21  0433 10/30/21  0641 10/31/21  0624   NA  --   --  137  --  136   K  --   --  3.0*  --  3.6*   CL  --   --  97*  --  100   CO2  --   --  26  --  25   GLUCOSE  --   --  102*  --  93   BUN  --   --  10  --  12   CREATININE  --   --  0.67  --  0.71   MG  --   --  2.1  --   --    ANIONGAP  --   --  14  --  11   LABGLOM  --   --  >60  --  >60   GFRAA  --   --  >60  --  >60   CALCIUM  --   --  9.3  --  8.8   PROBNP  --   --  112  --   --    TROPHS 91* 104*  --  120*  --      Recent Labs     10/30/21  0433 10/31/21  0624 10/31/21  0800 10/31/21  1149   PROT 6.2* 6.1*  --   --    LABALBU 3.8 3.5  --   --    AST 21 24  --   --    ALT 15 17  --   --    ALKPHOS 83 79  --   --    BILITOT 0.56 0.57  --   --    CHOL 182  --   --   --    HDL 57  --   --   --    LDLCHOLESTEROL 104  --   --   --    CHOLHDLRATIO 3.2  --   --   --    TRIG 103  --   --   --    VLDL NOT REPORTED  --   --   --    POCGLU  --   --  92 110*     ABG:No results found for: POCPH, PHART, PH, POCPCO2, VIE6HKP, PCO2, POCPO2, PO2ART, PO2, POCHCO3, TTG5FAP, HCO3, NBEA, PBEA, BEART, BE, THGBART, THB, UBG9OPZ, QQLH2LIR, D5TQZDBD, O2SAT, FIO2  No results found for: SPECIAL  No results found for: CULTURE    Radiology:  CTA CHEST W WO CONTRAST    Result Date: 10/29/2021  No acute findings with no evidence of aortic dissection or pulmonary embolism. The thoracic aorta is normal in caliber with mild atherosclerosis. Mild bibasilar atelectasis in the lungs. XR CHEST PORTABLE    Result Date: 10/28/2021  No acute process. Physical Examination:        General appearance:  alert, cooperative and no distress  Mental Status:  oriented to person, place and time and normal affect  Lungs:  clear to auscultation bilaterally, normal effort  Heart:  regular rate and rhythm, no murmur  Abdomen:  soft, nontender, nondistended, normal bowel sounds, no masses, hepatomegaly, splenomegaly  Extremities:  no edema, redness, tenderness in the calves  Skin:  no gross lesions, rashes, induration    Assessment:        Hospital Problems         Last Modified POA    * (Principal) NSTEMI (non-ST elevated myocardial infarction) ([facility] [address] 10/29/2021 Yes    HTN (hypertension) 10/29/2021 Yes    Mild intermittent asthma without complication [birth year]1969 Yes          Plan:        1. NSTEMI: Troponin 64->91->104. ECG at out facility shows NSR with non specific ST/T wave changes. Pt currently chest pain free. Check echocardiogram. Continue ASA, Plavix, Lipitor 80, Lopressor 25 mg QHS. Plan for cardiac cath Monday. 2. Acute Hypokalemia: replace potassium. 3. Uncontrolled Hypertension: Continue BB. Stop HCTZ, start ACE prior to d/c pending on Finding/EF. 4. Depression: Continue Celexa 10 mg daily  5. Allergic rhinitis: Flonase, Zyrtec  6. Asthma without exacerbation: continue nebulizer treatment. 7. Obesity BMI 34: recommend weight loss and lifestyle modifications. 8. Labs, imaging, ECG reviewed  9.  PT/OT    Esperanza Temple, DO  10/31/2021  6:12 PM

## 2021-11-01 LAB — PARTIAL THROMBOPLASTIN TIME: 60.2 SEC (ref 20.5–30.5)

## 2021-11-01 PROCEDURE — 6370000000 HC RX 637 (ALT 250 FOR IP): Performed by: STUDENT IN AN ORGANIZED HEALTH CARE EDUCATION/TRAINING PROGRAM

## 2021-11-01 PROCEDURE — 6370000000 HC RX 637 (ALT 250 FOR IP): Performed by: NURSE PRACTITIONER

## 2021-11-01 PROCEDURE — 2500000003 HC RX 250 WO HCPCS

## 2021-11-01 PROCEDURE — 2709999900 HC NON-CHARGEABLE SUPPLY

## 2021-11-01 PROCEDURE — 6360000002 HC RX W HCPCS: Performed by: STUDENT IN AN ORGANIZED HEALTH CARE EDUCATION/TRAINING PROGRAM

## 2021-11-01 PROCEDURE — 6360000004 HC RX CONTRAST MEDICATION

## 2021-11-01 PROCEDURE — 6360000002 HC RX W HCPCS

## 2021-11-01 PROCEDURE — B2111ZZ FLUOROSCOPY OF MULTIPLE CORONARY ARTERIES USING LOW OSMOLAR CONTRAST: ICD-10-PCS | Performed by: STUDENT IN AN ORGANIZED HEALTH CARE EDUCATION/TRAINING PROGRAM

## 2021-11-01 PROCEDURE — 93458 L HRT ARTERY/VENTRICLE ANGIO: CPT | Performed by: INTERNAL MEDICINE

## 2021-11-01 PROCEDURE — 2060000000 HC ICU INTERMEDIATE R&B

## 2021-11-01 PROCEDURE — 36415 COLL VENOUS BLD VENIPUNCTURE: CPT

## 2021-11-01 PROCEDURE — 7100000011 HC PHASE II RECOVERY - ADDTL 15 MIN

## 2021-11-01 PROCEDURE — 7100000010 HC PHASE II RECOVERY - FIRST 15 MIN

## 2021-11-01 PROCEDURE — 4A023N8 MEASUREMENT OF CARDIAC SAMPLING AND PRESSURE, BILATERAL, PERCUTANEOUS APPROACH: ICD-10-PCS | Performed by: STUDENT IN AN ORGANIZED HEALTH CARE EDUCATION/TRAINING PROGRAM

## 2021-11-01 PROCEDURE — C1894 INTRO/SHEATH, NON-LASER: HCPCS

## 2021-11-01 PROCEDURE — 76937 US GUIDE VASCULAR ACCESS: CPT

## 2021-11-01 PROCEDURE — 85730 THROMBOPLASTIN TIME PARTIAL: CPT

## 2021-11-01 PROCEDURE — 99232 SBSQ HOSP IP/OBS MODERATE 35: CPT | Performed by: INTERNAL MEDICINE

## 2021-11-01 PROCEDURE — 6370000000 HC RX 637 (ALT 250 FOR IP): Performed by: INTERNAL MEDICINE

## 2021-11-01 PROCEDURE — B2151ZZ FLUOROSCOPY OF LEFT HEART USING LOW OSMOLAR CONTRAST: ICD-10-PCS | Performed by: STUDENT IN AN ORGANIZED HEALTH CARE EDUCATION/TRAINING PROGRAM

## 2021-11-01 RX ORDER — ACETAMINOPHEN 325 MG/1
650 TABLET ORAL EVERY 4 HOURS PRN
Status: DISCONTINUED | OUTPATIENT
Start: 2021-11-01 | End: 2021-11-02 | Stop reason: HOSPADM

## 2021-11-01 RX ORDER — SODIUM CHLORIDE 0.9 % (FLUSH) 0.9 %
5-40 SYRINGE (ML) INJECTION PRN
Status: DISCONTINUED | OUTPATIENT
Start: 2021-11-01 | End: 2021-11-02 | Stop reason: HOSPADM

## 2021-11-01 RX ORDER — SODIUM CHLORIDE 0.9 % (FLUSH) 0.9 %
5-40 SYRINGE (ML) INJECTION EVERY 12 HOURS SCHEDULED
Status: DISCONTINUED | OUTPATIENT
Start: 2021-11-01 | End: 2021-11-02 | Stop reason: HOSPADM

## 2021-11-01 RX ORDER — SODIUM CHLORIDE 9 MG/ML
25 INJECTION, SOLUTION INTRAVENOUS PRN
Status: DISCONTINUED | OUTPATIENT
Start: 2021-11-01 | End: 2021-11-02 | Stop reason: HOSPADM

## 2021-11-01 RX ADMIN — METOPROLOL TARTRATE 25 MG: 50 TABLET, FILM COATED ORAL at 22:00

## 2021-11-01 RX ADMIN — METOPROLOL TARTRATE 25 MG: 50 TABLET, FILM COATED ORAL at 09:20

## 2021-11-01 RX ADMIN — CITALOPRAM 10 MG: 10 TABLET, FILM COATED ORAL at 09:19

## 2021-11-01 RX ADMIN — FLUTICASONE PROPIONATE 2 SPRAY: 50 SPRAY, METERED NASAL at 09:21

## 2021-11-01 RX ADMIN — AMLODIPINE BESYLATE 5 MG: 5 TABLET ORAL at 09:19

## 2021-11-01 RX ADMIN — CETIRIZINE HYDROCHLORIDE 10 MG: 10 TABLET ORAL at 09:20

## 2021-11-01 RX ADMIN — HEPARIN SODIUM AND DEXTROSE 12.4 UNITS/KG/HR: 10000; 5 INJECTION INTRAVENOUS at 11:35

## 2021-11-01 RX ADMIN — CLOPIDOGREL 75 MG: 75 TABLET, FILM COATED ORAL at 09:19

## 2021-11-01 NOTE — PROGRESS NOTES
[facility]  Progress Note                   Date:   11/1/2021  Patient name: Burnis Sandifer  Date of admission:  10/29/2021  6:55 PM  MRN:   [id number]  YOB: 1955  PCP: Lavern Hernadez MD    Reason for Admission: Elevated troponin [R77.8]  NSTEMI (non-ST elevated myocardial infarction) ([facility] [address] [I21.4]    Subjective:       Clinical Changes /Abnormalities: Seen & examined in room with spouse at bedside. No acute CV issues/concerns overnight. Labs, vitals, & tele reviewed. NPO for cath today    Review of Systems    Medications:   Scheduled Meds:   potassium chloride  40 mEq Oral Once    aspirin  81 mg Oral Daily    clopidogrel  75 mg Oral Daily    amLODIPine  5 mg Oral Daily    fluticasone  2 spray Each Nostril Daily    cetirizine  10 mg Oral Daily    citalopram  10 mg Oral Daily    sodium chloride flush  5-40 mL IntraVENous 2 times per day    metoprolol tartrate  25 mg Oral BID    atorvastatin  80 mg Oral Nightly     Continuous Infusions:   heparin (PORCINE) Infusion 12.435 Units/kg/hr (10/31/21 1343)    sodium chloride       CBC:   Recent Labs     10/29/21  2011 10/30/21  0433 10/31/21  0624   WBC 7.9 7.7 7.0   HGB 12.7 12.5 12.1    301 299     BMP:    Recent Labs     10/30/21  0433 10/31/21  0624    136   K 3.0* 3.6*   CL 97* 100   CO2 26 25   BUN 10 12   CREATININE 0.67 0.71   GLUCOSE 102* 93     Hepatic:  Recent Labs     10/30/21  0433 10/31/21  0624   AST 21 24   ALT 15 17   BILITOT 0.56 0.57   ALKPHOS 83 79     Troponin:   Recent Labs     10/29/21  1913 10/30/21  0203 10/30/21  0641   TROPHS 91* 104* 120*     BNP: No results for input(s): BNP in the last 72 hours. Lipids:   Recent Labs     10/30/21  0433   CHOL 182   HDL 57     INR:   No results for input(s): INR in the last 72 hours.     Objective:   Vitals: /62   Pulse 73   Temp 98.6 °F (37 °C) (Oral)   Resp 16   Ht 5' 5\" (1.651 m)   Wt 205 lb 11 oz (93.3 kg)   SpO2 96%   BMI 34.23 kg/m² General appearance: alert and cooperative with exam  HEENT: Head: Normocephalic, no lesions, without obvious abnormality. Neck:no JVD, trachea midline, no adenopathy  Lungs: Clear to auscultation  Heart: Regular rate and rhythm, s1/s2 auscultated, no murmurs  Abdomen: soft, non-tender, bowel sounds active  Extremities: no edema  Neurologic: not done        Assessment / Acute Cardiac Problems:   1. NSTEMI  2. HTN    Patient Active Problem List:     NSTEMI (non-ST elevated myocardial infarction) ([facility] [address]     HTN (hypertension)     Mild intermittent asthma without complication      Plan of Treatment:   1. Stable and free of chest pain. K Continue Heparin gtt along with PO ASA, statin, BB, & Plavix   2. Replace K+. Keep K >4 and Mg > 2  3. I have discussed risks (including but not limited to vascular injury, infection, hematoma, contrast induced kidney dysfunction, CVA and MI), benefits, alternatives in detail. All questions answered. Patient agrees to proceed.        Electronically signed by NATAN Bernal CNP on 11/1/2021 at 10:13 [address] [facility].  [phone]

## 2021-11-01 NOTE — PROGRESS NOTES
[facility]  Office: [address], DO, [place] Ba[place], DO, Cynthia Rodriguez, DO, Arsalan Summer Blood, DO, Stephenie Ragland MD, Yanira Moore MD, Venita Banerjee MD, Tayo Wiggins MD, Eliot Jack MD, Los Newell MD, Rox Parker MD, Racheal Haas MD, Randy Justice DO, Sweta Preciado DO, Mela Diaz MD,  Jax Lay DO, Atul Vang MD, Oralia Flores MD, Dean Jaimes MD, Cathie Duarte MD, Claudeen Hook, MD, Zion Nunes MD, Serafin Nicolas, [facility], [facility], [facility], Drea William, CNP, Marylene Bucy, CNS, Poonam Bonds, CNP, Hai Newton, CNP, Johnie Meyer, CNP, Nita Mackenzie, CNP, Alex Hutchinson, CNP, Luis Eric, CNP, Venita Ferrara, PA-C, Carmen Love, [facility], Lyle Manriquez, CNP, Abel Bowden, CNP, Valarie Vital, CNP, Sourav Acharya, CNP, Mark Jennings, CNP, Vonda Funez, CNP, Jeffery Zelaya, [address] [place] [address]    Progress Note    11/1/2021    3:42 PM    Name:   Rika Wong  MRN:     [id number]     [place]:      [de-identified]   Room:   2007/2007-01  IP Day:  3  Admit Date:  10/29/2021  6:55 PM    PCP:   Ev Farnsworth MD  Code Status:  Full Code    Subjective:     C/C:   Chief Complaint   Patient presents with    Chest Pain     transfer from [place], elevated trops      Interval History Status: not changed. Pt seen and examined at bedside. Doing good. No complaints today. No chest pain, SOB, difficulty breathing, nausea, vomiting or diarrhea. Brief History: This is a 72-year-old female initially presented to the hospital for work-up of chest pain that started while she was working on a computer from home. Symptoms lasted approximately an hour.   Patient was transferred here for cardiac catheterization with stenting plan for the Monday    Review of Systems:     Constitutional:  negative for chills, fevers, sweats  Respiratory:  negative for cough, dyspnea on exertion, shortness of breath, wheezing  Cardiovascular:  negative for chest pain, chest pressure/discomfort, lower extremity edema, palpitations  Gastrointestinal:  negative for abdominal pain, constipation, diarrhea, nausea, vomiting  Neurological:  negative for dizziness, headache    Medications: Allergies: Allergies   Allergen Reactions    Other Itching     Floxins, itching       Current Meds:   Scheduled Meds:    potassium chloride  40 mEq Oral Once    aspirin  81 mg Oral Daily    clopidogrel  75 mg Oral Daily    amLODIPine  5 mg Oral Daily    fluticasone  2 spray Each Nostril Daily    cetirizine  10 mg Oral Daily    citalopram  10 mg Oral Daily    sodium chloride flush  5-40 mL IntraVENous 2 times per day    metoprolol tartrate  25 mg Oral BID    atorvastatin  80 mg Oral Nightly     Continuous Infusions:    heparin (PORCINE) Infusion 12.4 Units/kg/hr (11/01/21 1135)    sodium chloride       PRN Meds: heparin (porcine), heparin (porcine), sodium chloride flush, sodium chloride, potassium chloride **OR** potassium alternative oral replacement **OR** potassium chloride, magnesium sulfate, ondansetron **OR** ondansetron, acetaminophen **OR** acetaminophen, magnesium hydroxide, nitroGLYCERIN, albuterol    Data:     Past Medical History:   has a past medical history of Asthma, Hyperlipidemia, Hypertension, and IBS (irritable bowel syndrome). Social History:   reports that she has never smoked. She has never used smokeless tobacco. She reports current alcohol use. She reports that she does not use drugs.      Family History:   Family History   Problem Relation Age of Onset    Cancer Mother         pancreatic    High Blood Pressure Mother     Diabetes Mother     High Blood Pressure Father     High Cholesterol Father     Alzheimer's Disease Father     Other Father         A fib    High Blood Pressure Sister     High Cholesterol Sister     Cancer Paternal Aunt         breast    Cancer Paternal Uncle         unknown  Diabetes Maternal Grandmother     Diabetes Paternal Grandfather     Heart Disease Other         fathers side       Vitals:  /62   Pulse 73   Temp 98.6 °F (37 °C) (Oral)   Resp 16   Ht 5' 5\" (1.651 m)   Wt 205 lb 11 oz (93.3 kg)   SpO2 96%   BMI 34.23 kg/m²   Temp (24hrs), Av.4 °F (36.9 °C), Min:98.2 °F (36.8 °C), Max:98.6 °F (37 °C)    Recent Labs     10/31/21  0800 10/31/21  1149   POCGLU 92 110*       I/O (24Hr):     Intake/Output Summary (Last 24 hours) at 2021 1542  Last data filed at 10/31/2021 1811  Gross per 24 hour   Intake 455 ml   Output 550 ml   Net -95 ml       Labs:  Hematology:  Recent Labs     10/29/21  2011 10/30/21  0433 10/31/21  0624   WBC 7.9 7.7 7.0   RBC 4.21 4.18 4.00   HGB 12.7 12.5 12.1   HCT 37.3 37.2 37.0   MCV 88.6 89.0 92.5   MCH 30.2 29.9 30.3   MCHC 34.0 33.6 32.7   RDW 12.6 12.9 12.7    301 299   MPV 9.6 9.3 9.5     Chemistry:  Recent Labs     10/29/21  1913 10/30/21  0203 10/30/21  0433 10/30/21  0641 10/31/21  0624   NA  --   --  137  --  136   K  --   --  3.0*  --  3.6*   CL  --   --  97*  --  100   CO2  --   --  26  --  25   GLUCOSE  --   --  102*  --  93   BUN  --   --  10  --  12   CREATININE  --   --  0.67  --  0.71   MG  --   --  2.1  --   --    ANIONGAP  --   --  14  --  11   LABGLOM  --   --  >60  --  >60   GFRAA  --   --  >60  --  >60   CALCIUM  --   --  9.3  --  8.8   PROBNP  --   --  112  --   --    TROPHS 91* 104*  --  120*  --      Recent Labs     10/30/21  0433 10/31/21  0624 10/31/21  0800 10/31/21  1149   PROT 6.2* 6.1*  --   --    LABALBU 3.8 3.5  --   --    AST 21 24  --   --    ALT 15 17  --   --    ALKPHOS 83 79  --   --    BILITOT 0.56 0.57  --   --    CHOL 182  --   --   --    HDL 57  --   --   --    LDLCHOLESTEROL 104  --   --   --    CHOLHDLRATIO 3.2  --   --   --    TRIG 103  --   --   --    VLDL NOT REPORTED  --   --   --    POCGLU  --   --  92 110*     ABG:No results found for: POCPH, PHART, PH, POCPCO2, MAA4STZ, PCO2, pending cath findings.      Paris Spangler, DO  11/1/2021  3:42 PM

## 2021-11-01 NOTE — PLAN OF CARE
Problem: SAFETY  Goal: Free from accidental physical injury  Outcome: Not Met This Shift  Goal: Free from intentional harm  Outcome: Not Met This Shift     Problem: DAILY CARE  Goal: Daily care needs are met  Outcome: Not Met This Shift     Problem: PAIN  Goal: Patient's pain/discomfort is manageable  Outcome: Not Met This Shift     Problem: SKIN INTEGRITY  Goal: Skin integrity is maintained or improved  Outcome: Not Met This Shift     Problem: KNOWLEDGE DEFICIT  Goal: Patient/S.O. demonstrates understanding of disease process, treatment plan, medications, and discharge instructions.   Outcome: Not Met This Shift     Problem: DISCHARGE BARRIERS  Goal: Patient's continuum of care needs are met  Outcome: Not Met This Shift

## 2021-11-01 NOTE — PROGRESS NOTES
Patient admitted, consent signed and questions answered. Patient ready for procedure. Call light to reach with side rails up 2 of 2. Bilateral groin areas clipped.  Neymar Room at bedside with patient. History and physical complete.

## 2021-11-01 NOTE — OP NOTE
[facility]    CARDIAC CATHETERIZATION    Date:   11/1/2021  Patient name:  Daniel Wiley  Date of admission:  10/29/2021  6:55 PM  MRN:   [id number]  YOB: 1955    Operators:  Primary:   Maryann Duff MD (Attending Physician)        Procedure performed:     [x] Left Heart Catheterization. [] Graft Angiography. [x] Left Ventriculography. [] Right Heart Catheterization. [] Coronary Angiography. [] Aortic Valve Studies. [] PCI:      [] Other:       Pre Procedure Conscious Sedation Data:  ASA Class:    [] I [x] II [] III [] IV    Mallampati Class:  [] I [x] II [] III [] IV      Indication:  [] STEMI      [] + Stress test  [x] ACS      [] + EKG Changes  [] Non Q MI       [] Significant Risk Factors  [x] Recurrent Angina             [] Diabetes Mellitus    [] New LBBB      [] Uncontrolled HTN. [] CHF / Low EF changes     [] Abnormal CTA / Ca Score      Procedure:  Access:  [] Femoral  [x] Radial  artery       [x] Right  [] Left    Procedure: After informed consent was obtained with explanation of the risks and benefits, patient was brought to the cath lab. The access area was prepped and draped in sterile fashion. 1% lidocaine was used for local block. The artery was cannulated with 6  Fr sheath with brisk arterial blood return. The side port was frequently flushed and aspirated with normal saline. Findings:     Cardiac Arteries and Lesion Findings       LMCA: Normal 0% stenosis. LAD: Mild irregularities 10-20%. LCx: Normal 0% stenosis. RCA: Normal 0% stenosis. Coronary Tree        Dominance: Right       LV Analysis   LV function assessed as:Normal.       Conclusions        Procedure Summary        Non obstructive minimal CAD    Normal LV contractility. Recommendations        Medical treatments    Risk factor modification.      Estimated Blood Loss:            [x] Minimal 10-25 cc   [] Minimal < 25 cc      [] Moderate 25-50 cc         [] >50 cc        ____________________________________________________________________    History and Risk Factors    [x] Hypertension     [] Family history of CAD  [x] Hyperlipidemia     [] Cerebrovascular Disease   [] Prior MI       [] Peripheral Vascular disease   [] Prior PCI              [] Diabetes Mellitus    [] Left Main PCI. [] Currently on Dialysis. [] Prior CABG. [] Currently smoker. [] Cardiac Arrest outside of healthcare facility. [] Yes    [x] No        Witnessed     [] Yes   [] No     Arrest after arrival of EMS  [] Yes   [] No     [] Cardiac Arrest at other Facility. [] Yes   [x] No    Pre-Procedure Information. Heart Failure       [] Yes    [x] No        Class  [] I      [] II  [] III    [] IV. New Diagnosis    [] Yes  [] No    HF Type      [] Systolic   [] Diastolic          [] Unknown. Diagnostic Test:   EKG       [] Normal   [x] Abnormal    New antiarrhythmia medications:    [] Yes   [] No   New onset atrial fibrillation / Flutter     [] Yes   [] No   ECG Abnormalities:      [] V. Fib   [] Miya V. Tach           [] NS V. T   [] New LBBB           [] T. Inv  []  ST dev > 0.5 mm         [] PVC's freq  [] PVC's infrequent    Stress Test Performed:      [] Yes    [x] No     Type:     [] Stress Echo   [] Exercise Stress Test (no imaging)      [] Stress Nuclear  [] Stress Imaging     Results   [] Negative   [] Positive        [] Indeterminate  [] Unavailable     If Positive/ Risk / Extent of Ischemia:       [] Low  [] Intermediate         [] High  [] Unavailable      Cardiac CTA Performed:     [] Yes    [x] No      Results   [] CAD   [] Non obstructive CAD      [] No CAD   [] Uncertain      [] Unknown   [] Structural Disease.      Pre Procedure Medications:   [] Yes    [x] No         [] ASA   [] Beta Blockers      [] Nitrate   [] Ca Channel Blockers      [] Ranolazine   [] Statin       [] Plavix/Others antiplatelets      Electronically signed on 11/1/2021 at 7:14 PM by:    Mariam Walton

## 2021-11-02 VITALS
HEART RATE: 64 BPM | TEMPERATURE: 98.6 F | RESPIRATION RATE: 18 BRPM | BODY MASS INDEX: 34.27 KG/M2 | WEIGHT: 205.69 LBS | HEIGHT: 65 IN | SYSTOLIC BLOOD PRESSURE: 120 MMHG | OXYGEN SATURATION: 94 % | DIASTOLIC BLOOD PRESSURE: 65 MMHG

## 2021-11-02 LAB
ALBUMIN SERPL-MCNC: 3.4 G/DL (ref 3.5–5.2)
ANION GAP SERPL CALCULATED.3IONS-SCNC: 9 MMOL/L (ref 9–17)
BUN BLDV-MCNC: 14 MG/DL (ref 8–23)
BUN/CREAT BLD: ABNORMAL (ref 9–20)
CALCIUM SERPL-MCNC: 8.6 MG/DL (ref 8.6–10.4)
CHLORIDE BLD-SCNC: 105 MMOL/L (ref 98–107)
CO2: 21 MMOL/L (ref 20–31)
CREAT SERPL-MCNC: 0.65 MG/DL (ref 0.5–0.9)
GFR AFRICAN AMERICAN: >60 ML/MIN
GFR NON-AFRICAN AMERICAN: >60 ML/MIN
GFR SERPL CREATININE-BSD FRML MDRD: ABNORMAL ML/MIN/{1.73_M2}
GFR SERPL CREATININE-BSD FRML MDRD: ABNORMAL ML/MIN/{1.73_M2}
GLUCOSE BLD-MCNC: 86 MG/DL (ref 70–99)
LV EF: 66 %
LVEF MODALITY: NORMAL
PHOSPHORUS: 3 MG/DL (ref 2.6–4.5)
POTASSIUM SERPL-SCNC: 4 MMOL/L (ref 3.7–5.3)
SODIUM BLD-SCNC: 135 MMOL/L (ref 135–144)

## 2021-11-02 PROCEDURE — 94664 DEMO&/EVAL PT USE INHALER: CPT

## 2021-11-02 PROCEDURE — 2580000003 HC RX 258: Performed by: STUDENT IN AN ORGANIZED HEALTH CARE EDUCATION/TRAINING PROGRAM

## 2021-11-02 PROCEDURE — 6370000000 HC RX 637 (ALT 250 FOR IP): Performed by: STUDENT IN AN ORGANIZED HEALTH CARE EDUCATION/TRAINING PROGRAM

## 2021-11-02 PROCEDURE — 80069 RENAL FUNCTION PANEL: CPT

## 2021-11-02 PROCEDURE — 36415 COLL VENOUS BLD VENIPUNCTURE: CPT

## 2021-11-02 PROCEDURE — 94761 N-INVAS EAR/PLS OXIMETRY MLT: CPT

## 2021-11-02 PROCEDURE — 93356 MYOCRD STRAIN IMG SPCKL TRCK: CPT

## 2021-11-02 PROCEDURE — 99239 HOSP IP/OBS DSCHRG MGMT >30: CPT | Performed by: STUDENT IN AN ORGANIZED HEALTH CARE EDUCATION/TRAINING PROGRAM

## 2021-11-02 PROCEDURE — 93306 TTE W/DOPPLER COMPLETE: CPT

## 2021-11-02 RX ORDER — METOPROLOL SUCCINATE 25 MG/1
25 TABLET, EXTENDED RELEASE ORAL DAILY
Qty: 30 TABLET | Refills: 0 | Status: SHIPPED | OUTPATIENT
Start: 2021-11-02 | End: 2022-08-23 | Stop reason: ALTCHOICE

## 2021-11-02 RX ORDER — NITROGLYCERIN 0.4 MG/1
TABLET SUBLINGUAL
Qty: 25 TABLET | Refills: 3 | Status: SHIPPED | OUTPATIENT
Start: 2021-11-02

## 2021-11-02 RX ORDER — ASPIRIN 81 MG/1
81 TABLET, CHEWABLE ORAL DAILY
Qty: 30 TABLET | Refills: 0 | Status: SHIPPED | OUTPATIENT
Start: 2021-11-03 | End: 2022-09-23

## 2021-11-02 RX ORDER — ATORVASTATIN CALCIUM 80 MG/1
80 TABLET, FILM COATED ORAL NIGHTLY
Qty: 30 TABLET | Refills: 3 | Status: SHIPPED | OUTPATIENT
Start: 2021-11-02 | End: 2022-08-23

## 2021-11-02 RX ORDER — AMLODIPINE BESYLATE 5 MG/1
5 TABLET ORAL DAILY
Qty: 30 TABLET | Refills: 0 | Status: SHIPPED | OUTPATIENT
Start: 2021-11-03 | End: 2022-10-12 | Stop reason: ALTCHOICE

## 2021-11-02 RX ADMIN — CETIRIZINE HYDROCHLORIDE 10 MG: 10 TABLET ORAL at 10:38

## 2021-11-02 RX ADMIN — ASPIRIN 81 MG: 81 TABLET, CHEWABLE ORAL at 10:38

## 2021-11-02 RX ADMIN — ATORVASTATIN CALCIUM 80 MG: 80 TABLET, FILM COATED ORAL at 00:36

## 2021-11-02 RX ADMIN — FLUTICASONE PROPIONATE 2 SPRAY: 50 SPRAY, METERED NASAL at 09:00

## 2021-11-02 RX ADMIN — SODIUM CHLORIDE, PRESERVATIVE FREE 10 ML: 5 INJECTION INTRAVENOUS at 11:09

## 2021-11-02 RX ADMIN — CITALOPRAM 10 MG: 10 TABLET, FILM COATED ORAL at 10:38

## 2021-11-02 RX ADMIN — METOPROLOL TARTRATE 25 MG: 50 TABLET, FILM COATED ORAL at 10:37

## 2021-11-02 RX ADMIN — AMLODIPINE BESYLATE 5 MG: 5 TABLET ORAL at 10:38

## 2021-11-02 ASSESSMENT — PAIN SCALES - GENERAL
PAINLEVEL_OUTOF10: 0

## 2021-11-02 NOTE — PROGRESS NOTES
LCx: Normal 0% stenosis. RCA: Normal 0% stenosis.      Coronary Tree        Dominance: Right       LV Analysis   LV function assessed as:Normal.         Conclusions        Procedure Summary        Non obstructive minimal CAD    Normal LV contractility.        Recommendations        Medical treatments    Risk factor modification. Review of Systems:     Constitutional:  negative for chills, fevers, sweats  Respiratory:  negative for cough, dyspnea on exertion, shortness of breath, wheezing  Cardiovascular:  negative for chest pain, chest pressure/discomfort, lower extremity edema, palpitations  Gastrointestinal:  negative for abdominal pain, constipation, diarrhea, nausea, vomiting  Neurological:  negative for dizziness, headache    Medications: Allergies:     Allergies   Allergen Reactions    Other Itching     Floxins, itching       Current Meds:   Scheduled Meds:    sodium chloride flush  5-40 mL IntraVENous 2 times per day    potassium chloride  40 mEq Oral Once    aspirin  81 mg Oral Daily    amLODIPine  5 mg Oral Daily    fluticasone  2 spray Each Nostril Daily    cetirizine  10 mg Oral Daily    citalopram  10 mg Oral Daily    sodium chloride flush  5-40 mL IntraVENous 2 times per day    metoprolol tartrate  25 mg Oral BID    atorvastatin  80 mg Oral Nightly     Continuous Infusions:    sodium chloride      heparin (PORCINE) Infusion 12.4 Units/kg/hr (11/01/21 1135)    sodium chloride       PRN Meds: sodium chloride flush, sodium chloride, acetaminophen, heparin (porcine), heparin (porcine), sodium chloride flush, sodium chloride, potassium chloride **OR** potassium alternative oral replacement **OR** potassium chloride, magnesium sulfate, ondansetron **OR** ondansetron, [DISCONTINUED] acetaminophen **OR** acetaminophen, magnesium hydroxide, nitroGLYCERIN, albuterol    Data:     Past Medical History:   has a past medical history of Asthma, Hyperlipidemia, Hypertension, and IBS (irritable bowel syndrome). Social History:   reports that she has never smoked. She has never used smokeless tobacco. She reports current alcohol use. She reports that she does not use drugs. Family History:   Family History   Problem Relation Age of Onset   [facility] Cancer Mother         pancreatic    High Blood Pressure Mother     Diabetes Mother     High Blood Pressure Father     High Cholesterol Father     Alzheimer's Disease Father     Other Father         A fib    High Blood Pressure Sister     High Cholesterol Sister     Cancer Paternal Aunt         breast    Cancer Paternal Uncle         unknown    Diabetes Maternal Grandmother     Diabetes Paternal Grandfather     Heart Disease Other         fathers side       Vitals:  /65   Pulse 64   Temp 98.6 °F (37 °C) (Oral)   Resp 18   Ht 5' 5\" (1.651 m)   Wt 205 lb 11 oz (93.3 kg)   SpO2 94%   BMI 34.23 kg/m²   Temp (24hrs), Av.1 °F (36.7 °C), Min:97.8 °F (36.6 °C), Max:98.6 °F (37 °C)    Recent Labs     10/31/21  0800 10/31/21  1149   POCGLU 92 110*       I/O (24Hr):     Intake/Output Summary (Last 24 hours) at 2021 1101  Last data filed at 2021 1843  Gross per 24 hour   Intake 129 ml   Output 470 ml   Net -341 ml       Labs:  Hematology:  Recent Labs     10/31/21  0624   WBC 7.0   RBC 4.00   HGB 12.1   HCT 37.0   MCV 92.5   MCH 30.3   MCHC 32.7   RDW 12.7      MPV 9.5     Chemistry:  Recent Labs     10/31/21  0624 21  0446    135   K 3.6* 4.0    105   CO2 25 21   GLUCOSE 93 86   BUN 12 14   CREATININE 0.71 0.65   ANIONGAP 11 9   LABGLOM >60 >60   GFRAA >60 >60   CALCIUM 8.8 8.6   PHOS  --  3.0     Recent Labs     10/31/21  0624 10/31/21  0800 10/31/21  1149 21  0446   PROT 6.1*  --   --   --    LABALBU 3.5  --   --  3.4*   AST 24  --   --   --    ALT 17  --   --   --    ALKPHOS 79  --   --   --    BILITOT 0.57  --   --   --    POCGLU  --  92 110*  --      ABG:No results found for: POCPH, PHART, PH, POCPCO2, OTL5GIT, PCO2, POCPO2, PO2ART, PO2, POCHCO3, PWW8LIE, HCO3, NBEA, PBEA, BEART, BE, THGBART, THB, OIG5WMW, UGJY1LQR, Y0QJJNAA, O2SAT, FIO2  No results found for: SPECIAL  No results found for: CULTURE    Radiology:  CTA CHEST W WO CONTRAST    Result Date: 10/29/2021  No acute findings with no evidence of aortic dissection or pulmonary embolism. The thoracic aorta is normal in caliber with mild atherosclerosis. Mild bibasilar atelectasis in the lungs. XR CHEST PORTABLE    Result Date: 10/28/2021  No acute process. Physical Examination:        General appearance:  alert, cooperative and no distress  Mental Status:  oriented to person, place and time and normal affect  Lungs:  clear to auscultation bilaterally, normal effort  Heart:  regular rate and rhythm, no murmur  Abdomen:  soft, nontender, nondistended, normal bowel sounds, no masses, hepatomegaly, splenomegaly  Extremities:  no edema, redness, tenderness in the calves  Skin:  no gross lesions, rashes, induration    Assessment:        Hospital Problems         Last Modified POA    * (Principal) NSTEMI (non-ST elevated myocardial infarction) ([facility] 10/29/2021 Yes    HTN (hypertension) 10/29/2021 Yes    Mild intermittent asthma without complication 02/68/1097 Yes          Plan:        1. Status post cardiac cath 11/1/2021 minimal coronary artery disease. Appreciate cardiology recommendations. Blood pressure medications were adjusted per cardiology recommendations. Currently on Toprol and Norvasc. 2. Plan for discharge today  3. Plan to follow-up with cardiologist as outpatient  4. Plan to follow-up with PCP as outpatient  5. Discussed with patient and  plan to discharge.  agreeable      Madison Tellez MD  11/2/2021  11:01 AM

## 2021-11-02 NOTE — DISCHARGE SUMMARY
[facility]  Office: [address], DO, Jaun Alfaroaden, DO, Daylin Craig, DO, Isabell Salazarar Blood, DO, Xiao Fairbanks MD, Coty Roberts MD, Yonatan Clark MD, Calli Cochran MD, Christina Elliott MD, Dylan Gil MD, Shari Manjarrez MD, Dimitri Calles MD, Dina Oshea DO, Ever Barnes, DO, Emmett Johnson MD,  Matheus Rodriguez DO, Huber Kirkland MD, Jordan Harley MD, Jaimie Clarke MD, Yeimi Manriquez MD, Gabriel Toth MD, Florinda Lowe MD, Camille Patel, [facility], CNP, Lyudmila Sewell, CNP, Delmy Perez, CNS, Margaret Carlisle, [facility], Shyanne Rivera, CNP, Haylee Hodgson, CNP, Mary Ann To, CNP, Ceferino Johnson, CNP, Hector Oviedo, CNP, Cristian Brown PA-C, Cleone Denver, [facility], Jeri Jane, CNP, Nita Sena, CNP, Yelena Olivares, CNP, Ed Hobson, CNP, Lara Arrieta, CNP, Cynthia Rosales, CNP, Amish Josselyn, [facility]    Discharge Summary     Patient ID: Ike Benitez  :  1955   MRN: [id number]     ACCOUNT:  [de-identified]   Patient's PCP: Jassi Campos MD  Admit Date: 10/29/2021   Discharge Date: 2021     Length of Stay: 4  Code Status:  Prior  Admitting Physician: No admitting provider for patient encounter. Discharge Physician: Emmett Johnson MD     Active Discharge Diagnoses:     Hospital Problem Lists:  Principal Problem:    NSTEMI (non-ST elevated myocardial infarction) ([facility]  Active Problems:    HTN (hypertension)    Mild intermittent asthma without complication  Resolved Problems:    * No resolved hospital problems.  *      Admission Condition:  serious     Discharged Condition: stable    Hospital Stay:     Hospital Course:  Ike Benitez is a 61 y.o. female who was admitted for the management of   NSTEMI (non-ST elevated myocardial infarction) ([facility] , presented to ER with Chest Pain (transfer from [place], elevated trops )      77-year-old female past medical history of hypertension presented due to chest pain worsening with exertion concern for NSTEMI and patient was transferred to [address] [place] [address] for cardiac catheterization. Patient underwent cardiac cath on 11/1/2021 finding showing:  Findings:      Cardiac Arteries and Lesion Findings       LMCA: Normal 0% stenosis. LAD: Mild irregularities 10-20%. LCx: Normal 0% stenosis. RCA: Normal 0% stenosis.      Coronary Tree        Dominance: Right       LV Analysis   LV function assessed as:Normal.         Conclusions        Procedure Summary        Non obstructive minimal CAD    Normal LV contractility.        Recommendations        Medical treatments    Risk factor modification.     BP medications adjusted per cadiology and patient discharged home    Significant therapeutic interventions: see above    Significant Diagnostic Studies:   Labs / Micro:  CBC:   Lab Results   Component Value Date    WBC 7.0 10/31/2021    RBC 4.00 10/31/2021    HGB 12.1 10/31/2021    HCT 37.0 10/31/2021    MCV 92.5 10/31/2021    MCH 30.3 10/31/2021    MCHC 32.7 10/31/2021    RDW 12.7 10/31/2021     10/31/2021     BMP:    Lab Results   Component Value Date    GLUCOSE 86 11/02/2021     11/02/2021    K 4.0 11/02/2021     11/02/2021    CO2 21 11/02/2021    ANIONGAP 9 11/02/2021    BUN 14 11/02/2021    CREATININE 0.65 11/02/2021    BUNCRER NOT REPORTED 11/02/2021    CALCIUM 8.6 11/02/2021    LABGLOM >60 11/02/2021    GFRAA >60 11/02/2021    GFR      11/02/2021    GFR NOT REPORTED 11/02/2021        Radiology:  CTA CHEST W WO CONTRAST    Result Date: 10/29/2021  No acute findings with no evidence of aortic dissection or pulmonary embolism. The thoracic aorta is normal in caliber with mild atherosclerosis. Mild bibasilar atelectasis in the lungs. XR CHEST PORTABLE    Result Date: 10/28/2021  No acute process.        Consultations:    Consults:     Final Specialist Recommendations/Findings:   IP CONSULT TO CARDIOLOGY  IP CONSULT TO HOSPITALIST  IP CONSULT TO CARDIOLOGY  IP CONSULT TO IV TEAM  IP CONSULT TO IV TEAM  IP CONSULT TO CARDIAC REHAB      The patient was seen and examined on day of discharge and this discharge summary is in conjunction with any daily progress note from day of discharge. Discharge plan:     Disposition: Home    Physician Follow Up:     Ellie Hester MD  [address] [facility]  [phone]      As needed    Mary Ellen Page MD  [address] [place] [id number] [phone]             Requiring Further Evaluation/Follow Up POST HOSPITALIZATION/Incidental Findings: follow up PCP follow up cardiology    Diet: cardiac diet    Activity: As tolerated    Instructions to Patient: please take your medications as prescribed, please follow up with your PCP    Discharge Medications:      Medication List      START taking these medications    amLODIPine 5 MG tablet  Commonly known as: NORVASC  Take 1 tablet by mouth daily  Start taking on: November 3, 2021     aspirin 81 MG chewable tablet  Take 1 tablet by mouth daily  Start taking on: November 3, 2021     metoprolol succinate 25 MG extended release tablet  Commonly known as: Toprol XL  Take 1 tablet by mouth daily     nitroGLYCERIN 0.4 MG SL tablet  Commonly known as: NITROSTAT  up to max of 3 total doses. If no relief after 1 dose, call 911.         CHANGE how you take these medications    atorvastatin 80 MG tablet  Commonly known as: LIPITOR  Take 1 tablet by mouth nightly  What changed:   · medication strength  · how much to take  · when to take this        CONTINUE taking these medications    albuterol sulfate  (90 Base) MCG/ACT inhaler     ALIGN PO     citalopram 10 MG tablet  Commonly known as: CELEXA     Claritin 10 MG capsule  Generic drug: loratadine     dicyclomine 20 MG tablet  Commonly known as: BENTYL     NASACORT AQ NA     Qvar 80 MCG/ACT inhaler  Generic drug: beclomethasone        STOP taking these medications    hydroCHLOROthiazide 50 MG tablet  Commonly known as: HYDRODIURIL           Where to Get Your Medications      These medications were sent to [facility] [address] RAMACHANDRAN [address] [facility], [address] [place] [address]    Phone: [phone]   · amLODIPine 5 MG tablet  · aspirin 81 MG chewable tablet  · atorvastatin 80 MG tablet  · metoprolol succinate 25 MG extended release tablet  · nitroGLYCERIN 0.4 MG SL tablet         No discharge procedures on file. Time Spent on discharge is  33 mins in patient examination, evaluation, counseling as well as medication reconciliation, prescriptions for required medications, discharge plan and follow up. Electronically signed by   Debbie Hunter MD  11/2/2021  6:44 PM      Thank you Dr. Precious Beckwith MD for the opportunity to be involved in this patient's care.

## 2021-11-02 NOTE — PLAN OF CARE
Problem: KNOWLEDGE DEFICIT  Goal: Patient/S.O. demonstrates understanding of disease process, treatment plan, medications, and discharge instructions.   Outcome: Ongoing     Problem: SKIN INTEGRITY  Goal: Skin integrity is maintained or improved  Outcome: Ongoing     Will continue to monitor patient

## 2022-03-16 ENCOUNTER — HOSPITAL ENCOUNTER (OUTPATIENT)
Dept: WOMENS IMAGING | Age: 67
Discharge: HOME | End: 2022-03-18
Payer: COMMERCIAL

## 2022-03-16 DIAGNOSIS — Z01.419 ENCOUNTER FOR ROUTINE GYNECOLOGICAL EXAMINATION WITH PAPANICOLAOU SMEAR OF CERVIX: ICD-10-CM

## 2022-03-16 PROCEDURE — 77063 BREAST TOMOSYNTHESIS BI: CPT

## 2022-03-29 ENCOUNTER — HOSPITAL ENCOUNTER (OUTPATIENT)
Age: 67
Discharge: HOME | End: 2022-03-29
Payer: COMMERCIAL

## 2022-03-29 ENCOUNTER — OFFICE VISIT (OUTPATIENT)
Dept: OBGYN | Age: 67
End: 2022-03-29
Payer: COMMERCIAL

## 2022-03-29 VITALS
SYSTOLIC BLOOD PRESSURE: 126 MMHG | DIASTOLIC BLOOD PRESSURE: 84 MMHG | HEIGHT: 65 IN | BODY MASS INDEX: 34.99 KG/M2 | WEIGHT: 210 LBS

## 2022-03-29 DIAGNOSIS — N95.2 VAGINAL ATROPHY: Primary | ICD-10-CM

## 2022-03-29 DIAGNOSIS — R63.5 WEIGHT GAIN: ICD-10-CM

## 2022-03-29 LAB — TSH SERPL DL<=0.05 MIU/L-ACNC: 1.8 UIU/ML (ref 0.3–5)

## 2022-03-29 PROCEDURE — 1036F TOBACCO NON-USER: CPT | Performed by: OBSTETRICS & GYNECOLOGY

## 2022-03-29 PROCEDURE — G8419 CALC BMI OUT NRM PARAM NOF/U: HCPCS | Performed by: OBSTETRICS & GYNECOLOGY

## 2022-03-29 PROCEDURE — 3017F COLORECTAL CA SCREEN DOC REV: CPT | Performed by: OBSTETRICS & GYNECOLOGY

## 2022-03-29 PROCEDURE — G8484 FLU IMMUNIZE NO ADMIN: HCPCS | Performed by: OBSTETRICS & GYNECOLOGY

## 2022-03-29 PROCEDURE — 84443 ASSAY THYROID STIM HORMONE: CPT

## 2022-03-29 PROCEDURE — G8427 DOCREV CUR MEDS BY ELIG CLIN: HCPCS | Performed by: OBSTETRICS & GYNECOLOGY

## 2022-03-29 PROCEDURE — 99202 OFFICE O/P NEW SF 15 MIN: CPT | Performed by: OBSTETRICS & GYNECOLOGY

## 2022-03-29 PROCEDURE — 36415 COLL VENOUS BLD VENIPUNCTURE: CPT

## 2022-03-29 RX ORDER — ESTRADIOL 10 UG/1
10 INSERT VAGINAL DAILY
Qty: 21 TABLET | Refills: 0 | Status: SHIPPED | OUTPATIENT
Start: 2022-03-29 | End: 2022-05-24 | Stop reason: ALTCHOICE

## 2022-03-29 RX ORDER — ESTRADIOL 10 UG/1
10 INSERT VAGINAL
Qty: 8 TABLET | Refills: 2 | Status: SHIPPED | OUTPATIENT
Start: 2022-03-31 | End: 2022-05-24 | Stop reason: SDUPTHER

## 2022-03-29 ASSESSMENT — PATIENT HEALTH QUESTIONNAIRE - PHQ9
SUM OF ALL RESPONSES TO PHQ QUESTIONS 1-9: 0
2. FEELING DOWN, DEPRESSED OR HOPELESS: 0
SUM OF ALL RESPONSES TO PHQ QUESTIONS 1-9: 0
SUM OF ALL RESPONSES TO PHQ QUESTIONS 1-9: 0
SUM OF ALL RESPONSES TO PHQ9 QUESTIONS 1 & 2: 0
SUM OF ALL RESPONSES TO PHQ QUESTIONS 1-9: 0
1. LITTLE INTEREST OR PLEASURE IN DOING THINGS: 0

## 2022-03-29 NOTE — PROGRESS NOTES
DATE OF VISIT:  3/29/22    PATIENT NAME:  Yves Rogers     YOB: 1955    REASON FOR VISIT:    Chief Complaint   Patient presents with    Pelvic Pain     Patient notes that she went for a pap on 3/9/22 and was unable to tolerate due to intense pain and the sample came back unsat. She notes that she did have some spotting after the exam but otherwise denies bleeding. HISTORY OF PRESENT ILLNESS:  Pt had unsat pap in March; states exam was painful and had bleeding after; disc'd vaginal atrophy and long term management; pt concerned by weight gain as well      No LMP recorded. Patient is postmenopausal.  Vitals:    03/29/22 1309   BP: 126/84   Weight: 210 lb (95.3 kg)   Height: 5' 5\" (1.651 m)     Body mass index is 34.95 kg/m².   Allergies   Allergen Reactions    Amoxicillin     Bactrim [Sulfamethoxazole-Trimethoprim]     Other Itching     Floxins, itching    Zocor [Simvastatin]     Zoloft [Sertraline]      Current Outpatient Medications   Medication Sig Dispense Refill    Estradiol (VAGIFEM) 10 MCG TABS vaginal tablet Place 1 tablet vaginally daily for 21 days 21 tablet 0    [START ON 3/31/2022] Estradiol (VAGIFEM) 10 MCG TABS vaginal tablet Place 1 tablet vaginally Twice a Week 8 tablet 2    atorvastatin (LIPITOR) 80 MG tablet Take 1 tablet by mouth nightly 30 tablet 3    aspirin 81 MG chewable tablet Take 1 tablet by mouth daily 30 tablet 0    metoprolol succinate (TOPROL XL) 25 MG extended release tablet Take 1 tablet by mouth daily 30 tablet 0    amLODIPine (NORVASC) 5 MG tablet Take 1 tablet by mouth daily 30 tablet 0    beclomethasone (QVAR) 80 MCG/ACT inhaler Inhale 1 puff into the lungs 2 times daily      citalopram (CELEXA) 10 MG tablet Take 10 mg by mouth daily      Triamcinolone Acetonide (NASACORT AQ NA) by Nasal route 2 times daily 1 spray      albuterol sulfate  (90 BASE) MCG/ACT inhaler Inhale 2 puffs into the lungs as needed for Wheezing      Loratadine (CLARITIN) 10 MG CAPS Take 10 mg by mouth daily       Probiotic Product (ALIGN PO) Take 1 capsule by mouth daily       nitroGLYCERIN (NITROSTAT) 0.4 MG SL tablet up to max of 3 total doses. If no relief after 1 dose, call 911. (Patient not taking: Reported on 3/29/2022) 25 tablet 3    dicyclomine (BENTYL) 20 MG tablet Take 20 mg by mouth 1/2 tab 4 times a week (Patient not taking: Reported on 3/29/2022)       No current facility-administered medications for this visit. Social History     Socioeconomic History    Marital status:      Spouse name: None    Number of children: None    Years of education: None    Highest education level: None   Occupational History    None   Tobacco Use    Smoking status: Never Smoker    Smokeless tobacco: Never Used   Vaping Use    Vaping Use: Never used   Substance and Sexual Activity    Alcohol use: Yes     Comment: occas    Drug use: No    Sexual activity: Not Currently     Partners: Male   Other Topics Concern    None   Social History Narrative    None     Social Determinants of Health     Financial Resource Strain:     Difficulty of Paying Living Expenses: Not on file   Food Insecurity:     Worried About Running Out of Food in the Last Year: Not on file    [place] of Food in the Last Year: Not on file   Transportation Needs:     Lack of Transportation (Medical): Not on file    Lack of Transportation (Non-Medical):  Not on file   Physical Activity:     Days of Exercise per Week: Not on file    Minutes of Exercise per Session: Not on file   Stress:     Feeling of Stress : Not on file   Social Connections:     Frequency of Communication with Friends and Family: Not on file    Frequency of Social Gatherings with Friends and Family: Not on file    Attends Orthodoxy Services: Not on file    Active Member of Clubs or Organizations: Not on file    Attends Club or Organization Meetings: Not on file    Marital Status: Not on file   Intimate Partner Violence:     Fear of Current or Ex-Partner: Not on file    Emotionally Abused: Not on file    Physically Abused: Not on file    Sexually Abused: Not on file   Housing Stability:     Unable to Pay for Housing in the Last Year: Not on file    Number of [place] in the Last Year: Not on file    Unstable Housing in the Last Year: Not on file       REVIEW OF SYSTEMS:  Review of Systems   Constitutional: Positive for unexpected weight change (gain). Negative for chills and fever. Genitourinary: Positive for vaginal bleeding (after exam) and vaginal pain. Negative for dysuria and pelvic pain. PHYSICAL EXAM:  /84   Ht 5' 5\" (1.651 m)   Wt 210 lb (95.3 kg)   BMI 34.95 kg/m²   Physical Exam  Constitutional:       Appearance: Normal appearance. Genitourinary:      Vulva normal.      No vaginal discharge. No vaginal prolapse present. Severe vaginal atrophy present. HENT:      Head: Normocephalic and atraumatic. Eyes:      Extraocular Movements: Extraocular movements intact. Pupils: Pupils are equal, round, and reactive to light. Cardiovascular:      Rate and Rhythm: Normal rate. Pulmonary:      Effort: Pulmonary effort is normal.   Musculoskeletal:         General: Normal range of motion. Cervical back: Normal range of motion. Neurological:      Mental Status: She is alert and oriented to person, place, and time. Skin:     General: Skin is warm and dry. Psychiatric:         Mood and Affect: Mood normal.         Behavior: Behavior normal.         Thought Content: Thought content normal.         Judgment: Judgment normal.       The patient, Sophia De Leon is a 61 y.o. female, was seen with a total time spent of 20 minutes for the visit on this date of service by the E/M provider. The time component had both face to face and non face to face time spent in determining the total time component.   Counseling and education regarding her diagnosis listed below and her options regarding those diagnoses were also included in determining her time component. Diagnosis Orders   1. Vaginal atrophy     2. Weight gain  TSH        The patient had her preventative health maintenance recommendations and follow-up reviewed with her at the completion of her visit. ASSESSMENT:      1. Weight gain        PLAN:  No orders of the defined types were placed in this encounter. Return in about 8 weeks (around 5/24/2022) for follow up.        Electronically signed by Talib Gutierrez DO on 03/29/22

## 2022-05-24 ENCOUNTER — OFFICE VISIT (OUTPATIENT)
Dept: OBGYN | Age: 67
End: 2022-05-24
Payer: COMMERCIAL

## 2022-05-24 VITALS
HEIGHT: 65 IN | WEIGHT: 211.6 LBS | SYSTOLIC BLOOD PRESSURE: 118 MMHG | BODY MASS INDEX: 35.25 KG/M2 | DIASTOLIC BLOOD PRESSURE: 76 MMHG

## 2022-05-24 DIAGNOSIS — N95.2 VAGINAL ATROPHY: Primary | ICD-10-CM

## 2022-05-24 PROCEDURE — G8400 PT W/DXA NO RESULTS DOC: HCPCS | Performed by: OBSTETRICS & GYNECOLOGY

## 2022-05-24 PROCEDURE — G8417 CALC BMI ABV UP PARAM F/U: HCPCS | Performed by: OBSTETRICS & GYNECOLOGY

## 2022-05-24 PROCEDURE — 99213 OFFICE O/P EST LOW 20 MIN: CPT | Performed by: OBSTETRICS & GYNECOLOGY

## 2022-05-24 PROCEDURE — 1036F TOBACCO NON-USER: CPT | Performed by: OBSTETRICS & GYNECOLOGY

## 2022-05-24 PROCEDURE — G8427 DOCREV CUR MEDS BY ELIG CLIN: HCPCS | Performed by: OBSTETRICS & GYNECOLOGY

## 2022-05-24 PROCEDURE — 1090F PRES/ABSN URINE INCON ASSESS: CPT | Performed by: OBSTETRICS & GYNECOLOGY

## 2022-05-24 PROCEDURE — 3017F COLORECTAL CA SCREEN DOC REV: CPT | Performed by: OBSTETRICS & GYNECOLOGY

## 2022-05-24 PROCEDURE — 1123F ACP DISCUSS/DSCN MKR DOCD: CPT | Performed by: OBSTETRICS & GYNECOLOGY

## 2022-05-24 PROCEDURE — 99211 OFF/OP EST MAY X REQ PHY/QHP: CPT | Performed by: OBSTETRICS & GYNECOLOGY

## 2022-05-24 RX ORDER — ESTRADIOL 10 UG/1
10 INSERT VAGINAL
Qty: 8 TABLET | Refills: 11 | Status: SHIPPED | OUTPATIENT
Start: 2022-05-26

## 2022-05-24 RX ORDER — BECLOMETHASONE DIPROPIONATE HFA 80 UG/1
AEROSOL, METERED RESPIRATORY (INHALATION)
COMMUNITY
Start: 2022-04-22 | End: 2022-09-23 | Stop reason: ALTCHOICE

## 2022-05-24 NOTE — PROGRESS NOTES
DATE OF VISIT:  5/24/22    PATIENT NAME:  Larisa Rogers     YOB: 1955    REASON FOR VISIT:    Chief Complaint   Patient presents with    Discuss Medications     Patient presents today for follow up on Estradiol medication used for vaginal atrophy. patient states she is doing well with medication. HISTORY OF PRESENT ILLNESS:  Pt had seen me bc she experienced vaginal bleeding after pap with another provider; we had disc'd vaginal atrophy at that appt; pt had not been sexually active in some time; she has been on vaginal estrogen now and feels better; denies any discharge now - had some initially; has gotten vaginal dilators and working toward becoming sexually active again      No LMP recorded. Patient is postmenopausal.  Vitals:    05/24/22 0926   BP: 118/76   Weight: 211 lb 9.6 oz (96 kg)   Height: 5' 5\" (1.651 m)     Body mass index is 35.21 kg/m².   Allergies   Allergen Reactions    Amoxicillin     Bactrim [Sulfamethoxazole-Trimethoprim]     Other Itching     Floxins, itching    Zocor [Simvastatin]     Zoloft [Sertraline]      Current Outpatient Medications   Medication Sig Dispense Refill    QVAR REDIHALER 80 MCG/ACT AERB inhaler       [START ON 5/26/2022] Estradiol (VAGIFEM) 10 MCG TABS vaginal tablet Place 1 tablet vaginally Twice a Week 8 tablet 11    atorvastatin (LIPITOR) 80 MG tablet Take 1 tablet by mouth nightly 30 tablet 3    beclomethasone (QVAR) 80 MCG/ACT inhaler Inhale 1 puff into the lungs 2 times daily      citalopram (CELEXA) 10 MG tablet Take 10 mg by mouth daily      Triamcinolone Acetonide (NASACORT AQ NA) by Nasal route 2 times daily 1 spray      albuterol sulfate  (90 BASE) MCG/ACT inhaler Inhale 2 puffs into the lungs as needed for Wheezing      Loratadine (CLARITIN) 10 MG CAPS Take 10 mg by mouth daily       Probiotic Product (ALIGN PO) Take 1 capsule by mouth daily       aspirin 81 MG chewable tablet Take 1 tablet by mouth daily 30 tablet 0    metoprolol succinate (TOPROL XL) 25 MG extended release tablet Take 1 tablet by mouth daily 30 tablet 0    nitroGLYCERIN (NITROSTAT) 0.4 MG SL tablet up to max of 3 total doses. If no relief after 1 dose, call 911. (Patient not taking: Reported on 3/29/2022) 25 tablet 3    amLODIPine (NORVASC) 5 MG tablet Take 1 tablet by mouth daily 30 tablet 0    dicyclomine (BENTYL) 20 MG tablet Take 20 mg by mouth 1/2 tab 4 times a week (Patient not taking: Reported on 3/29/2022)       No current facility-administered medications for this visit. Social History     Socioeconomic History    Marital status:      Spouse name: None    Number of children: None    Years of education: None    Highest education level: None   Occupational History    None   Tobacco Use    Smoking status: Never Smoker    Smokeless tobacco: Never Used   Vaping Use    Vaping Use: Never used   Substance and Sexual Activity    Alcohol use: Yes     Comment: occas    Drug use: No    Sexual activity: Not Currently     Partners: Male   Other Topics Concern    None   Social History Narrative    None     Social Determinants of Health     Financial Resource Strain:     Difficulty of Paying Living Expenses: Not on file   Food Insecurity:     Worried About Running Out of Food in the Last Year: Not on file    Juliet of Food in the Last Year: Not on file   Transportation Needs:     Lack of Transportation (Medical): Not on file    Lack of Transportation (Non-Medical):  Not on file   Physical Activity:     Days of Exercise per Week: Not on file    Minutes of Exercise per Session: Not on file   Stress:     Feeling of Stress : Not on file   Social Connections:     Frequency of Communication with Friends and Family: Not on file    Frequency of Social Gatherings with Friends and Family: Not on file    Attends Restorationism Services: Not on file    Active Member of Clubs or Organizations: Not on file    Attends Club or Organization Meetings: Not on file    Marital Status: Not on file   Intimate Partner Violence:     Fear of Current or Ex-Partner: Not on file    Emotionally Abused: Not on file    Physically Abused: Not on file    Sexually Abused: Not on file   Housing Stability:     Unable to Pay for Housing in the Last Year: Not on file    Number of [place] in the Last Year: Not on file    Unstable Housing in the Last Year: Not on file       REVIEW OF SYSTEMS:  Review of Systems   Constitutional: Negative for chills and fever. Genitourinary: Positive for dyspareunia (atrophy). Negative for dysuria, pelvic pain, vaginal bleeding and vaginal discharge. PHYSICAL EXAM:  /76   Ht 5' 5\" (1.651 m)   Wt 211 lb 9.6 oz (96 kg)   BMI 35.21 kg/m²   Physical Exam  Constitutional:       Appearance: Normal appearance. HENT:      Head: Normocephalic and atraumatic. Eyes:      Extraocular Movements: Extraocular movements intact. Pupils: Pupils are equal, round, and reactive to light. Cardiovascular:      Rate and Rhythm: Normal rate. Pulmonary:      Effort: Pulmonary effort is normal.   Neurological:      Mental Status: She is alert and oriented to person, place, and time. Skin:     General: Skin is dry. Psychiatric:         Mood and Affect: Mood normal.         Behavior: Behavior normal.         Thought Content: Thought content normal.         Judgment: Judgment normal.       The patient, Sophia De Leon is a 74 y.o. female, was seen with a total time spent of 10 minutes for the visit on this date of service by the E/M provider. The time component had both face to face and non face to face time spent in determining the total time component. Counseling and education regarding her diagnosis listed below and her options regarding those diagnoses were also included in determining her time component. Diagnosis Orders   1.  Vaginal atrophy          The patient had her preventative health maintenance recommendations and follow-up reviewed with her at the completion of her visit. ASSESSMENT:      1. Vaginal atrophy        PLAN:  No orders of the defined types were placed in this encounter. Return in about 1 year (around 5/24/2023) for follow up.        Electronically signed by Princess Iraheta DO on 05/24/22

## 2022-08-23 ENCOUNTER — OFFICE VISIT (OUTPATIENT)
Dept: CARDIOLOGY | Age: 67
End: 2022-08-23
Payer: MEDICARE

## 2022-08-23 VITALS
DIASTOLIC BLOOD PRESSURE: 61 MMHG | OXYGEN SATURATION: 97 % | RESPIRATION RATE: 18 BRPM | WEIGHT: 209.8 LBS | HEART RATE: 74 BPM | SYSTOLIC BLOOD PRESSURE: 112 MMHG | HEIGHT: 65 IN | BODY MASS INDEX: 34.95 KG/M2

## 2022-08-23 DIAGNOSIS — R06.02 SHORTNESS OF BREATH: ICD-10-CM

## 2022-08-23 DIAGNOSIS — I25.2 HISTORY OF NON-ST ELEVATION MYOCARDIAL INFARCTION (NSTEMI): ICD-10-CM

## 2022-08-23 DIAGNOSIS — I25.10 CORONARY ARTERY DISEASE INVOLVING NATIVE CORONARY ARTERY OF NATIVE HEART WITHOUT ANGINA PECTORIS: Primary | ICD-10-CM

## 2022-08-23 DIAGNOSIS — E78.2 MIXED HYPERLIPIDEMIA: ICD-10-CM

## 2022-08-23 DIAGNOSIS — I10 PRIMARY HYPERTENSION: ICD-10-CM

## 2022-08-23 PROCEDURE — 99211 OFF/OP EST MAY X REQ PHY/QHP: CPT | Performed by: FAMILY MEDICINE

## 2022-08-23 PROCEDURE — 93010 ELECTROCARDIOGRAM REPORT: CPT | Performed by: FAMILY MEDICINE

## 2022-08-23 PROCEDURE — 3017F COLORECTAL CA SCREEN DOC REV: CPT | Performed by: FAMILY MEDICINE

## 2022-08-23 PROCEDURE — 1090F PRES/ABSN URINE INCON ASSESS: CPT | Performed by: FAMILY MEDICINE

## 2022-08-23 PROCEDURE — G8400 PT W/DXA NO RESULTS DOC: HCPCS | Performed by: FAMILY MEDICINE

## 2022-08-23 PROCEDURE — G8427 DOCREV CUR MEDS BY ELIG CLIN: HCPCS | Performed by: FAMILY MEDICINE

## 2022-08-23 PROCEDURE — 93005 ELECTROCARDIOGRAM TRACING: CPT | Performed by: FAMILY MEDICINE

## 2022-08-23 PROCEDURE — G8417 CALC BMI ABV UP PARAM F/U: HCPCS | Performed by: FAMILY MEDICINE

## 2022-08-23 PROCEDURE — 1036F TOBACCO NON-USER: CPT | Performed by: FAMILY MEDICINE

## 2022-08-23 PROCEDURE — 99204 OFFICE O/P NEW MOD 45 MIN: CPT | Performed by: FAMILY MEDICINE

## 2022-08-23 PROCEDURE — 1123F ACP DISCUSS/DSCN MKR DOCD: CPT | Performed by: FAMILY MEDICINE

## 2022-08-23 RX ORDER — ATORVASTATIN CALCIUM 40 MG/1
40 TABLET, FILM COATED ORAL NIGHTLY
Qty: 30 TABLET | Refills: 3 | Status: SHIPPED | OUTPATIENT
Start: 2022-08-23

## 2022-08-23 NOTE — PROGRESS NOTES
Terrence Justice am scribing for and in the presence of Candis Chin MD, MS, F.A.C.C..    Patient: Reji Bell  : 1955  Date of Visit: 2022    REASON FOR VISIT / CONSULTATION: New Patient (HX: HTN, HLD, CAD, NSTEMI. Pt is here to establish care today. Pt says she is doing okay, she has some questions about her angina back in  when she saw Dr. Tiana Salter. Light headed if she stands up too quickly. SOB sometimes when going up stairs. Pt denies CP, palpitations, )      History of Present Illness:        Dear Mike Burt MD,    I had the pleasure of seeing Reji Bell in my office today. Ms. Ron Ruiz is a 74 y.o. female with a history of atherosclerotic heart disease including a NSTEMI in . She also has a history of tachycardia back in the s. She was diagnosed with    Family History: Both of Ms. Rogers's parents and sister have a history of hypertension. EKG done today in office 22: Normal Sinus Rhythm. Ms. Ron Ruiz is here today to establish care. She was seen by Dr. Tiana Salter at [facility] in  for a NSTEMI. She does have some shortness of breath with exertion especially going up steps. She has some light headed/dizziness if she stands up too quickly. She denies any chest pain, pressure, or tightness. Denies palpitations. She can walk 10,000 steps without stopping a year ago, before her heart attack. Now she can walk maybe a half a mile due to getting fatigued. She complains of feeling very tired and thinks this could be due to some of her medications that she is taking. Denies any current leg swelling. Ms. Ron Ruiz denies any chest pain now or in the recent past, increased shortness of breath, abdominal pain, bleeding problems, problems with her medications or any other concerns at this time. Exercise Tolerance: Ms. Ron Ruiz reports that she has a fairly good exercise tolerance.      PAST MEDICAL HISTORY:         Past Medical History:   Diagnosis Date    Abnormal glucose     Allergic rhinitis     Angina pectoris with documented spasm (HCC)     Asthma     Depression     External hemorrhoid     Goiter     Hyperlipidemia     Hypertension     IBS (irritable bowel syndrome)     Insomnia        CURRENT ALLERGIES: Amoxicillin, Bactrim [sulfamethoxazole-trimethoprim], Other, Zocor [simvastatin], and Zoloft [sertraline] REVIEW OF SYSTEMS: 14 systems were reviewed. Pertinent positives and negatives as above, all else negative.      Past Surgical History:   Procedure Laterality Date    CARDIAC CATHETERIZATION      COLONOSCOPY  05/30/2017    Dr. Preston Marie NOT  [address] [place] [address] N/A 05/30/2017    COLONOSCOPY performed by Karina Milan MD at [address] History:  Social History     Tobacco Use    Smoking status: Never    Smokeless tobacco: Never   Vaping Use    Vaping Use: Never used   Substance Use Topics    Alcohol use: Yes     Comment: occas    Drug use: No        CURRENT MEDICATIONS:        Outpatient Medications Marked as Taking for the 8/23/22 encounter (Office Visit) with Drea Spencer MD   Medication Sig Dispense Refill    QVAR REDIHALER 80 MCG/ACT AERB inhaler       Estradiol (VAGIFEM) 10 MCG TABS vaginal tablet Place 1 tablet vaginally Twice a Week 8 tablet 11    atorvastatin (LIPITOR) 80 MG tablet Take 1 tablet by mouth nightly 30 tablet 3    aspirin 81 MG chewable tablet Take 1 tablet by mouth daily 30 tablet 0    amLODIPine (NORVASC) 5 MG tablet Take 1 tablet by mouth daily 30 tablet 0    beclomethasone (QVAR) 80 MCG/ACT inhaler Inhale 1 puff into the lungs 2 times daily      citalopram (CELEXA) 10 MG tablet Take 10 mg by mouth daily      dicyclomine (BENTYL) 20 MG tablet Take 20 mg by mouth 1/2 tab 4 times a week      Triamcinolone Acetonide (NASACORT AQ NA) by Nasal route 2 times daily 1 spray      albuterol sulfate  (90 BASE) MCG/ACT inhaler Inhale 2 puffs into the lungs as needed for Wheezing Component Value Date    WBC 7.0 10/31/2021    HGB 12.1 10/31/2021    HCT 37.0 10/31/2021     10/31/2021    CHOL 182 10/30/2021    TRIG 103 10/30/2021    HDL 57 10/30/2021    ALT 17 10/31/2021    AST 24 10/31/2021     11/02/2021    K 4.0 11/02/2021     11/02/2021    CREATININE 0.65 11/02/2021    BUN 14 11/02/2021    CO2 21 11/02/2021    TSH 1.80 03/29/2022    INR 1.0 10/28/2021     ASSESSMENT:     1. Coronary artery disease involving native coronary artery of native heart without angina pectoris    2. Primary hypertension    3. Mixed hyperlipidemia    4. History of non-ST elevation myocardial infarction (NSTEMI)       PLAN:        Atherosclerotic Heart Disease including history of NSTEMI back in 11/21. heart catheterization done but no stenting done at that time. Antiplatelet Agent: Continue Aspirin 81 mg daily. Beta Blocker: STOP Metoprolol succinate (Toprol XL) due to possible side effects of fatigue  Anti-anginal medications: Continue nitroglycerin 0.4 mg tablets as needed for chest pain. Cholesterol Reduction Therapy: DECREASE to Atorvastatin (Lipitor) 40 mg daily. due to possible side effects of fatigue but may increase again in 6 weeks depending on symptoms  Additional counseling: I advised them to call our office or go to the emergency room if they developed worsening or persistent chest pain or increased shortness of breath as this could be life threatening. We discussed the potential benefits of cardiac rehab to improve both her cardiac condition as well as improve her exercise tolerance and overall quality of life. She was very agreeable with this and therefore I made the referral for Phase II cardiac rehab. Essential Hypertension: Controlled  Beta Blocker: STOP Metoprolol succinate (Toprol XL)   ACE Inibitor/ARB: Not indicated at this time. Calcium Channel Blocker: Continue amlodipine (Norvasc) 5 mg once daily. Diuretics: Not indicated at this time.    Additional Testing List: I ordered a echocardiogram to better assess for the etiology of this problem and to help guide future management and I ordered a treadmill stress test WITHOUT imaging to try and rule out this possibility. Hyperlipidemia: Mixed  Cholesterol Reduction Therapy: DECREASE to Atorvastatin (Lipitor) 40 mg daily. Shortness of breath with mild-moderate exertion: Unclear etiology but before starting cardiac rehab would like to rule out any significant angina/arrhythmias   Additional Testing List: I ordered a echocardiogram to better assess for the etiology of this problem and to help guide future management and I ordered a treadmill stress test WITHOUT imaging to try and rule out this possibility. Finally, I recommended that she continue her current medications and follow up with you as previously scheduled. FOLLOW UP:   I told Ms. Rogers to call my office if she had any problems, but otherwise I asked her to Return in about 6 weeks (around 10/4/2022). However, I would be happy to see her sooner should the need arise. Sincerely,  Aide Monroy MD, MS, F.A.C.C. [facility]    [address] [place] [address], [address] [place] [address]  Phone: [phone], Fax: [phone]     I believe that the risk of significant morbidity and mortality related to the patient's current medical conditions are: Intermediate. The documentation recorded by the scribe, accurately and completely reflects the services I personally performed and the decisions made by me. Aide Monroy MD, MS, F.A.C.C.  August 23, 2022

## 2022-08-23 NOTE — PATIENT INSTRUCTIONS
SURVEY:    You may be receiving a survey from [employer] regarding your visit today. Please complete the survey to enable us to provide the highest quality of care to you and your family. If you cannot score us a very good on any question, please call the office to discuss how we could have made your experience a very good one. Thank you.

## 2022-08-26 ENCOUNTER — HOSPITAL ENCOUNTER (OUTPATIENT)
Dept: NON INVASIVE DIAGNOSTICS | Age: 67
Discharge: HOME | End: 2022-08-26
Payer: MEDICARE

## 2022-08-26 DIAGNOSIS — E78.2 MIXED HYPERLIPIDEMIA: ICD-10-CM

## 2022-08-26 DIAGNOSIS — I25.10 CORONARY ARTERY DISEASE INVOLVING NATIVE CORONARY ARTERY OF NATIVE HEART WITHOUT ANGINA PECTORIS: ICD-10-CM

## 2022-08-26 DIAGNOSIS — I10 PRIMARY HYPERTENSION: ICD-10-CM

## 2022-08-26 DIAGNOSIS — I25.2 HISTORY OF NON-ST ELEVATION MYOCARDIAL INFARCTION (NSTEMI): ICD-10-CM

## 2022-08-26 LAB
LV EF: 75 %
LVEF MODALITY: NORMAL

## 2022-08-26 PROCEDURE — 93306 TTE W/DOPPLER COMPLETE: CPT

## 2022-08-26 PROCEDURE — 93017 CV STRESS TEST TRACING ONLY: CPT

## 2022-08-26 NOTE — PROCEDURES
diagnostic criteria for myocardial ischemia with no premature  atrial contractions (PACs) and no premature ventricular contractions  (PVCs). IMPRESSION:  Significant electrocardiographic evidence of myocardial ischemia during  EKG monitoring without significant associated arrhythmias. The patient's Duke Treadmill score is -2, which correlates with an  intermediate risk significant coronary artery disease. Based on the patient's abnormal exercise stress test results, a repeat  study with the addition of cardiac imaging is recommended.         Corbin Castleman, MD    D: 08/26/2022 16:24:36       T: 08/26/2022 16:25:40     [email or web address]  Job#: [id number]     Doc#: Unknown    CC:  MD Michel Man MD

## 2022-08-29 ENCOUNTER — TELEPHONE (OUTPATIENT)
Dept: CARDIOLOGY | Age: 67
End: 2022-08-29

## 2022-08-29 DIAGNOSIS — I10 PRIMARY HYPERTENSION: ICD-10-CM

## 2022-08-29 DIAGNOSIS — I25.10 CORONARY ARTERY DISEASE INVOLVING NATIVE CORONARY ARTERY OF NATIVE HEART WITHOUT ANGINA PECTORIS: Primary | ICD-10-CM

## 2022-08-29 NOTE — TELEPHONE ENCOUNTER
----- Message from Ramin Portillo MD sent at 8/26/2022 11:38 PM EDT -----  Please let Ms. Rogers know that her treadmill stress test was inconclusive so we need to order a stress test with cardiac imaging. Please order Reta Stress test if patient agreeable. Thanks.

## 2022-08-29 NOTE — TELEPHONE ENCOUNTER
----- Message from Ramin Portillo MD sent at 8/26/2022 11:38 PM EDT -----  Please let Ms. Rogers know that her treadmill stress test was inconclusive so we need to order a stress test with cardiac imaging. Please order Erta Stress test if patient agreeable. Thanks.

## 2022-09-15 ENCOUNTER — HOSPITAL ENCOUNTER (OUTPATIENT)
Dept: NON INVASIVE DIAGNOSTICS | Age: 67
Discharge: HOME | End: 2022-09-15
Payer: MEDICARE

## 2022-09-15 DIAGNOSIS — I25.10 CORONARY ARTERY DISEASE INVOLVING NATIVE CORONARY ARTERY OF NATIVE HEART WITHOUT ANGINA PECTORIS: ICD-10-CM

## 2022-09-15 DIAGNOSIS — I10 PRIMARY HYPERTENSION: ICD-10-CM

## 2022-09-15 PROCEDURE — 93017 CV STRESS TEST TRACING ONLY: CPT

## 2022-09-15 PROCEDURE — 6360000002 HC RX W HCPCS: Performed by: FAMILY MEDICINE

## 2022-09-15 PROCEDURE — A9500 TC99M SESTAMIBI: HCPCS | Performed by: FAMILY MEDICINE

## 2022-09-15 PROCEDURE — 3430000000 HC RX DIAGNOSTIC RADIOPHARMACEUTICAL: Performed by: FAMILY MEDICINE

## 2022-09-15 RX ADMIN — REGADENOSON 0.4 MG: 0.08 INJECTION, SOLUTION INTRAVENOUS at 10:32

## 2022-09-15 RX ADMIN — Medication 30 MILLICURIE: at 10:21

## 2022-09-16 ENCOUNTER — HOSPITAL ENCOUNTER (OUTPATIENT)
Dept: NON INVASIVE DIAGNOSTICS | Age: 67
Discharge: HOME | End: 2022-09-16
Payer: MEDICARE

## 2022-09-16 PROCEDURE — A9500 TC99M SESTAMIBI: HCPCS | Performed by: FAMILY MEDICINE

## 2022-09-16 PROCEDURE — 3430000000 HC RX DIAGNOSTIC RADIOPHARMACEUTICAL: Performed by: FAMILY MEDICINE

## 2022-09-16 PROCEDURE — 78452 HT MUSCLE IMAGE SPECT MULT: CPT

## 2022-09-16 RX ADMIN — Medication 30 MILLICURIE: at 13:10

## 2022-09-19 NOTE — PROCEDURES
[facility], [address] [facility]                              CARDIAC STRESS TEST    PATIENT NAME: Iftikhar Badillo                 :        1955  MED REC NO:   [id number]                              ROOM:  ACCOUNT NO:   [de-identified]                           ADMIT DATE: 09/15/2022  PROVIDER:     Chance Pickard MD    CARDIOVASCULAR DIAGNOSTIC DEPARTMENT    DATE OF STUDY:  09/15/2022    ORDERING PROVIDER:  Drea Spencer MD    PRIMARY CARE PROVIDER:  Lake Wright MD    INTERPRETING PHYSICIAN:  Socorro Ruiz. Tru Merrill MD    PHARMACOLOGIC MYOCARDIAL PERFUSION STRESS TESTING    Stress/Rest single isotope SPECT imaging with exercise stress and gated  SPECT imaging. INDICATIONS:  Assessment of recent chest pain and/or chest discomfort. CLINICAL HISTORY:  The patient is a 74year-old woman with known  coronary artery disease. Previous cardiac history includes:  Coronary artery disease, stress  test, cardiac catheterization. Other previous history includes:  Chest pain, dyspnea, arthritis,  hypertension, family history of coronary artery disease <60 in mother  and father. Symptoms just prior to testing include:  None. Relevant medications:  Metoprolol (Toprol). Amlodipine (Norvasc). PROCEDURE:  The heart rate was 73 at baseline and laurie to 101 beats per  minute during the regadenoson infusion. The rest blood pressure was  124/78 mm/Hg and increased to 146/76 mm/Hg. The patient did complain of  shortness of breath following infusion. Pharmacologic stress testing was performed with regadenoson at a dose of  0.4 mg. Additionally, low level exercise using hand compressions were  performed along with vasodilator infusion. MYOCARDIAL PERFUSION IMAGING:  Imaging was performed at rest 30-45  minutes following the injection of 30 mCi of sestamibi.   Approximately  10 seconds after Lexiscan injection, the patient was injected with 30  mCi of sestamibi. Gating post-stress tomographic imaging was performed  30-45 minutes after stress. STRESS ECG RESULTS:  The resting electrocardiogram demonstrated normal  sinus rhythm without significant ST-segment abnormalities that may  impair accurate ECG detection of stress induced cardiac ischemia. During vasodilator infusion and during recovery, the patient developed:    No significant ST segment changes suggestive of myocardial ischemia with  no premature atrial contractions (PACs) and no premature ventricular  contractions (PVCs). NUCLEAR IMAGING RESULTS:  The overall quality of the study is fair. No  significant attenuation artifact was seen. There is no evidence of  abnormal lung uptake. Additionally, the right ventricle appears normal.  The left ventricular cavity is noted to be normal in size on the stress  images. There is evidence of transient ischemic dilatation (TID) of the  left ventricle. 1.29    Gated SPECT imaging reveals normal myocardial thickening and wall motion  with a calculated left ventricular ejection fraction of 79%. The rest images demonstrated a small perfusion abnormality of mild  intensity in the anterior region which is most likely due to artifact. On stress imaging, a small perfusion abnormality of mild intensity in  the anterior region which is most likely due to artifact. IMPRESSION:  1. Largely normal myocardial perfusion imaging with soft tissue  artifact, but without significant evidence of myocardial ischemia or  infarction. In addition, transient ischemic dilatation (TID) of the left ventricle  is seen which can be seen with uncontrolled hypertension, severe left  main coronary artery disease, or severe 3-vessel coronary artery  disease. (TID: 1.29)    2. Global left ventricular systolic function was normal with an  ejection fraction of 79%, without regional wall motion abnormalities.     3.  No significant electrocardiographic evidence of myocardial ischemia  during EKG monitoring without significant associated arrhythmias. Overall, these results are most consistent with a low to intermediate  risk scan. Depending on the patient's symptoms and level of clinical suspicion,  aggressive medical management versus additional testing by coronary  angiography may be indicated. The sensitivity for detecting ischemia on this test may have been  reduced due to the patient being on a beta blocker and a calcium channel  blocker.           Ward Seo MD    D: 09/19/2022 15:05:55       T: 09/19/2022 15:09:13     [email or web address]  Job#: [id number]     Doc#: Unknown    CC:  MD Doris Ugalde MD

## 2022-09-20 ENCOUNTER — TELEPHONE (OUTPATIENT)
Dept: CARDIOLOGY | Age: 67
End: 2022-09-20

## 2022-09-20 NOTE — TELEPHONE ENCOUNTER
----- Message from Julien Patino MD sent at 9/19/2022 11:34 PM EDT -----  Please let Ms. Rogers know that her test was abnormal and please make them an appointment in 2 WEEKS if not already done. Thanks.     Slava

## 2022-09-20 NOTE — RESULT ENCOUNTER NOTE
Please let Ms. Rogers know that her test was abnormal and please make them an appointment in 2 WEEKS if not already done. Thanks.     Slava

## 2022-09-23 ENCOUNTER — OFFICE VISIT (OUTPATIENT)
Dept: CARDIOLOGY | Age: 67
End: 2022-09-23
Payer: MEDICARE

## 2022-09-23 VITALS
RESPIRATION RATE: 18 BRPM | BODY MASS INDEX: 34.49 KG/M2 | HEART RATE: 71 BPM | DIASTOLIC BLOOD PRESSURE: 72 MMHG | OXYGEN SATURATION: 96 % | WEIGHT: 207 LBS | SYSTOLIC BLOOD PRESSURE: 124 MMHG | HEIGHT: 65 IN

## 2022-09-23 DIAGNOSIS — R94.39 ABNORMAL STRESS TEST: ICD-10-CM

## 2022-09-23 DIAGNOSIS — I10 PRIMARY HYPERTENSION: ICD-10-CM

## 2022-09-23 DIAGNOSIS — I25.10 CORONARY ARTERY DISEASE INVOLVING NATIVE CORONARY ARTERY OF NATIVE HEART WITHOUT ANGINA PECTORIS: Primary | ICD-10-CM

## 2022-09-23 DIAGNOSIS — E78.2 MIXED HYPERLIPIDEMIA: ICD-10-CM

## 2022-09-23 DIAGNOSIS — R06.02 SHORTNESS OF BREATH: ICD-10-CM

## 2022-09-23 PROCEDURE — G8417 CALC BMI ABV UP PARAM F/U: HCPCS | Performed by: FAMILY MEDICINE

## 2022-09-23 PROCEDURE — 1123F ACP DISCUSS/DSCN MKR DOCD: CPT | Performed by: FAMILY MEDICINE

## 2022-09-23 PROCEDURE — 1036F TOBACCO NON-USER: CPT | Performed by: FAMILY MEDICINE

## 2022-09-23 PROCEDURE — 99214 OFFICE O/P EST MOD 30 MIN: CPT | Performed by: FAMILY MEDICINE

## 2022-09-23 PROCEDURE — 99211 OFF/OP EST MAY X REQ PHY/QHP: CPT | Performed by: FAMILY MEDICINE

## 2022-09-23 PROCEDURE — G8400 PT W/DXA NO RESULTS DOC: HCPCS | Performed by: FAMILY MEDICINE

## 2022-09-23 PROCEDURE — 3017F COLORECTAL CA SCREEN DOC REV: CPT | Performed by: FAMILY MEDICINE

## 2022-09-23 PROCEDURE — G8427 DOCREV CUR MEDS BY ELIG CLIN: HCPCS | Performed by: FAMILY MEDICINE

## 2022-09-23 PROCEDURE — 1090F PRES/ABSN URINE INCON ASSESS: CPT | Performed by: FAMILY MEDICINE

## 2022-09-23 NOTE — PATIENT INSTRUCTIONS
SURVEY:    You may be receiving a survey from [employer] regarding your visit today. Please complete the survey to enable us to provide the highest quality of care to you and your family. If you cannot score us a very good on any question, please call the office to discuss how we could have made your experience a very good one. Thank you.

## 2022-09-23 NOTE — PROGRESS NOTES
Murl Flow am scribing for and in the presence of Franco Marcelino MD, MS, F.A.C.C..    Patient: Dougie Cadena  : 1955  Date of Visit: 2022    REASON FOR VISIT / CONSULTATION: Follow-up (HX: ASHD, HTN, HLD, SOB. Pt is here for a 6-8 week follow up. Had an echo and a stress test. Pt says she is feeling better. She does have shortness of breath with exertion. Pt denies CP, palpitations, light headed or dizziness. )      History of Present Illness:        Dear Marta Domingo MD,    I had the pleasure of seeing Dougie Cadena in my office today. Ms. Edel Melo is a 74 y.o. female with a history of atherosclerotic heart disease including a NSTEMI in . She also has a history of tachycardia back in the s. Family History: Both of Ms. Rogers's parents and sister have a history of hypertension. EKG done in office 22: Normal Sinus Rhythm. Stress test done on 9/15/22: Mildly abnormal. Echo done on 22: Left ventricle is normal in size, global left ventricular systolic functions normal, calculated ejection fraction is 75%. Normal mitral valve structure. Trivial mitral regurgitation. Ms. Edel Melo is here today for a 6-8 week follow up. She had an echocardiogram and a stress test done. She says she does feel better. She has some shortness of breath with exertion or going up stairs. She denies any chest pain, pressure, or tightness. She denies any light headed/dizziness or any palpitations. She says she has not been sleeping very well. Ms. Edel Melo denies any chest pain now or in the recent past, increased shortness of breath, abdominal pain, bleeding problems, problems with her medications or any other concerns at this time. Exercise Tolerance: Ms. Edel Melo reports that she has a fairly good exercise tolerance.      PAST MEDICAL HISTORY:         Past Medical History:   Diagnosis Date    Abnormal glucose     Allergic rhinitis     Angina pectoris with documented WBC 7.0 10/31/2021    HGB 12.1 10/31/2021    HCT 37.0 10/31/2021     10/31/2021    CHOL 182 10/30/2021    TRIG 103 10/30/2021    HDL 57 10/30/2021    ALT 17 10/31/2021    AST 24 10/31/2021     11/02/2021    K 4.0 11/02/2021     11/02/2021    CREATININE 0.65 11/02/2021    BUN 14 11/02/2021    CO2 21 11/02/2021    TSH 1.80 03/29/2022    INR 1.0 10/28/2021     ASSESSMENT:     1. Coronary artery disease involving native coronary artery of native heart without angina pectoris    2. Primary hypertension    3. Mixed hyperlipidemia    4. Shortness of breath    5. Abnormal stress test      PLAN:      Abnormal Stress Test: Low Risk  In spite of her intermittent symptoms, I believe these symptoms are relatively atypical in nature and therefore is likely not due to coronary ischemia. Although her stress test was not perfect, it was relatively unremarkable with a low and at most low-intermediate risk and based on her atypical symptoms and relatively unremarkable echocardiogram, I honestly do not think that further workup for a cardiac source of her symptoms is likely indicated. Having said this, I did reiterate the reality that no test is 100% sensitive and therefore if symptoms persist, worsen and/or clinical suspicion for significant ischemic coronary artery disease remains high, further testing including the possibility of cardiac catheterization may be appropriate to reconsider. In the end, Ms. Rogers said that she was not really worried about the symptoms and therefore at least for the time being preferred to not go through with anymore diagnostic cardiac testing or treatment unless absolutely necessary. I do think this is a reasonable approach, however I told her to be sure to call you or call our office if her symptoms worsened and/or if she changed her mind about this approach and at that point we could reconsider additional testing and/or treatment strategies.      Atherosclerotic Heart Disease including history of NSTEMI back in 11/21. heart catheterization done but no stenting done at that time. Antiplatelet Agent: Continue Aspirin 81 mg daily. Beta Blocker: Not indicated at this time. due to possible side effects of fatigue  Anti-anginal medications: Continue nitroglycerin 0.4 mg tablets as needed for chest pain. Cholesterol Reduction Therapy: Continue Atorvastatin (Lipitor) 40 mg daily. Additional counseling: I advised them to call our office or go to the emergency room if they developed worsening or persistent chest pain or increased shortness of breath as this could be life threatening. Essential Hypertension: Controlled  Beta Blocker: Not indicated at this time. ACE Inibitor/ARB: Not indicated at this time. Calcium Channel Blocker: Continue amlodipine (Norvasc) 5 mg once daily. Diuretics: Not indicated at this time. Additional Testing List: None    Hyperlipidemia: Mixed. Last LDL was 104 on 10/30/2021  Cholesterol Reduction Therapy: Continue Atorvastatin (Lipitor) 40 mg daily. Shortness of breath with mild-moderate exertion: Unclear etiology but before starting cardiac rehab would like to rule out any significant angina/arrhythmias   Additional Testing List: None     Finally, I recommended that she continue her current medications and follow up with you as previously scheduled. FOLLOW UP:   I told Ms. Rogers to call my office if she had any problems, but otherwise I asked her to Return in about 1 year (around 9/23/2023). However, I would be happy to see her sooner should the need arise. Sincerely,  Estefany Maier MD, MS, F.A.C.C. [facility] Cardiology Specialist    [address] include 234 beds at the [facility] [place], [address] [place] [address]  Phone: [phone], Fax: [phone]     I believe that the risk of significant morbidity and mortality related to the patient's current medical conditions are: Intermediate.  Approximately 30 minutes were spent during prep work, discussion and exam of the

## 2022-09-26 ENCOUNTER — TELEPHONE (OUTPATIENT)
Dept: CARDIAC REHAB | Age: 67
End: 2022-09-26

## 2022-09-26 NOTE — TELEPHONE ENCOUNTER
Phoned patient to schedule assessment. Patient states Dr. Telma Gentile told her she did not need rehab. She will call and check w/Dr. Telma Gentile and call us back.

## 2022-10-10 ENCOUNTER — TELEPHONE (OUTPATIENT)
Dept: CARDIOLOGY | Age: 67
End: 2022-10-10

## 2022-10-10 NOTE — TELEPHONE ENCOUNTER
At patients appointment on 8/23/22 she was to d/c Toprol XL. She mixed Toprol XL with Norvasc and d/c Norvasc and continued Toprol XL. Pt denies any symptoms. BP has been 119/60, 117/63, and 115/98 with a HR between 55-78. Please advise?

## 2022-10-12 RX ORDER — METOPROLOL SUCCINATE 25 MG/1
25 TABLET, EXTENDED RELEASE ORAL DAILY
COMMUNITY

## 2022-10-12 NOTE — TELEPHONE ENCOUNTER
Please let patient know that I am ok with her continuing on Toprol XL, I was switching it because of her reported symptoms of fatigue to see if the switch would help but if she is doing well I am just as happy to have her stay on Toprol XL. Thanks.

## 2022-12-12 NOTE — PROGRESS NOTES
Medical Week 1 Survey    Flowsheet Row Responses   [facility] patient discharged from? [facility]   Does the patient have one of the following disease processes/diagnoses(primary or secondary)? Other   Week 1 attempt successful? Yes   Call start time 0953   Call end time 1013   Discharge diagnosis Acute left-sided weakness, rule out CVALeft facial droopAtrial fibrillation,   Person spoke with today (if not patient) and relationship  and Pt   Meds reviewed with patient/caregiver? Yes   Is the patient having any side effects they believe may be caused by any medication additions or changes? No   Does the patient have all medications ordered at discharge? No   Nursing Interventions Nurse provided patient education, Nurse called pharmacy   Prescription comments Call to [facility] and they do have scripts ready. pt is aware to  med   Is the patient taking all medications as directed (includes completed medication regime)? No   What is preventing the patient from taking all medications as directed? Other   Nursing Interventions Nurse notified pharmacy for assistance, Nurse provided patient education   Medication comments  and Pt states that they are not aware of the Lipitor. Call placed to [facility].    Has home health visited the patient within 72 hours of discharge? Unsure   Home health comments  states that he has a number to call if they do not call him today   Psychosocial issues? No   Did the patient receive a copy of their discharge instructions? Yes   Nursing interventions Reviewed instructions with patient   What is the patient's perception of their health status since discharge? Improving   Is the patient/caregiver able to teach back signs and symptoms related to disease process for when to call PCP? Yes   Is the patient/caregiver able to teach back signs and symptoms related to disease process for when to call 911? Yes   Is the patient/caregiver able to teach back the hierarchy of  [facility]  Progress Note                   Date:   11/2/2021  Patient name: Lori Davis  Date of admission:  10/29/2021  6:55 PM  MRN:   [id number]  YOB: 1955  PCP: Nafisa Chavez MD    Reason for Admission: Elevated troponin [R77.8]  NSTEMI (non-ST elevated myocardial infarction) ([facility] [address] [I21.4]    Subjective:       Clinical Changes /Abnormalities: Seen & examined in room with spouse & RN. No acute CV issues/concerns overnight. Labs, vitals, & tele reviewed. No post cath site issues/concerns. Review of Systems    Medications:   Scheduled Meds:   sodium chloride flush  5-40 mL IntraVENous 2 times per day    potassium chloride  40 mEq Oral Once    aspirin  81 mg Oral Daily    amLODIPine  5 mg Oral Daily    fluticasone  2 spray Each Nostril Daily    cetirizine  10 mg Oral Daily    citalopram  10 mg Oral Daily    sodium chloride flush  5-40 mL IntraVENous 2 times per day    metoprolol tartrate  25 mg Oral BID    atorvastatin  80 mg Oral Nightly     Continuous Infusions:   sodium chloride      heparin (PORCINE) Infusion 12.4 Units/kg/hr (11/01/21 1135)    sodium chloride       CBC:   Recent Labs     10/31/21  0624   WBC 7.0   HGB 12.1        BMP:    Recent Labs     10/31/21  0624 11/02/21  0446    135   K 3.6* 4.0    105   CO2 25 21   BUN 12 14   CREATININE 0.71 0.65   GLUCOSE 93 86     Hepatic:  Recent Labs     10/31/21  0624   AST 24   ALT 17   BILITOT 0.57   ALKPHOS 79     Troponin:   No results for input(s): TROPHS in the last 72 hours. BNP: No results for input(s): BNP in the last 72 hours. Lipids:   No results for input(s): CHOL, HDL in the last 72 hours. Invalid input(s): LDLCALCU  INR:   No results for input(s): INR in the last 72 hours.     Objective:   Vitals: /60   Pulse 64   Temp 98 °F (36.7 °C) (Oral)   Resp 18   Ht 5' 5\" (1.651 m)   Wt 205 lb 11 oz (93.3 kg)   SpO2 94%   BMI 34.23 kg/m²   General appearance: alert and cooperative with exam  HEENT: Head: Normocephalic, no lesions, without obvious abnormality. Neck:no JVD, trachea midline, no adenopathy  Lungs: Clear to auscultation  Heart: Regular rate and rhythm, s1/s2 auscultated, no murmurs  Abdomen: soft, non-tender, bowel sounds active  Extremities: no edema  Neurologic: not done    Cardiac Cath 11/1/21  Findings:      Cardiac Arteries and Lesion Findings       LMCA: Normal 0% stenosis. LAD: Mild irregularities 10-20%. LCx: Normal 0% stenosis. RCA: Normal 0% stenosis.      Coronary Tree        Dominance: Right       LV Analysis   LV function assessed as:Normal.         Conclusions        Procedure Summary        Non obstructive minimal CAD    Normal LV contractility.        Recommendations        Medical treatments    Risk factor modification. Assessment / Acute Cardiac Problems:   1. NSTEMI  2. Non-obstructive CAD on cath  3. HTN  4. HLP    Patient Active Problem List:     NSTEMI (non-ST elevated myocardial infarction) ([facility]     HTN (hypertension)     Mild intermittent asthma without complication      Plan of Treatment:   1. Cath reviewed in detail as above. Questions/concerns addressed  2. Continue PO ASA, statin, Norvasc, & BB.  3. OK for discharge from CV standpoint.  Can f/u in [facility] PRN      Electronically signed by NATAN Fritz CNP on 11/2/2021 at 10:30 AM  [address]  [phone] who to call/visit for symptoms/problems? PCP, Specialist, Home health nurse, Urgent Care, ED, 911 Yes   Week 1 call completed? Yes   Wrap up additional comments Pt did not know about picking up Lipitor as she went to rehab. pharm confirms she has med ready for . Pt now aware.           LEN ORTEGA - Registered Nurse

## 2023-03-16 NOTE — ED PROVIDER NOTES
[facility]  EMERGENCY DEPARTMENT ENCOUNTER      Pt Name: Remonia Hodgkin  MRN: [id number]  [place] 1955  Date of evaluation: 10/28/2021  Provider: Jennifer Mcdermott MD    CHIEF COMPLAINT       Chief Complaint   Patient presents with    Chest Pain     mid sternal chest pain, onset about 15 min PTA. Pt states she took 650 mg of ASA PTA         HISTORY OF PRESENT ILLNESS   (Location/Symptom, Timing/Onset, Context/Setting, Quality, Duration, Modifying Factors, Severity)  Note limiting factors. Remonia Hodgkin is a 61 y.o. female who presents to the emergency department     79-year-old patient presents emergency department with a history for developing substernal chest tightness with radiation to both of her arms at approximately 9:45 AM (40 minutes prior to ED arrival). Patient does admit to a previous history for hypertension. She denies any previous history for myocardial infarction's angina, deep venous thrombosis or pulmonary emboli. Patient did take two 325 mg aspirin to ED arrival.  Initial chest discomfort 7 out of 10. Her discomfort upon arrival is 2 out of 10. She denies any preceding headache. There is been no history of syncopal episodes. Nursing Notes were reviewed. REVIEW OF SYSTEMS    (2-9 systems for level 4, 10 or more for level 5)     Review of Systems   All other systems reviewed and are negative. Except as noted above the remainder of the review of systems was reviewed and negative.        PAST MEDICAL HISTORY     Past Medical History:   Diagnosis Date    Asthma     Hyperlipidemia     Hypertension     IBS (irritable bowel syndrome)          SURGICAL HISTORY       Past Surgical History:   Procedure Laterality Date    COLONOSCOPY  05/30/2017    Dr. Ligia Reeves NOT  [address] [place] [address] N/A 5/30/2017    COLONOSCOPY performed by Nita Ramos MD at [address] Medications    ALBUTEROL SULFATE  (90 BASE) MCG/ACT INHALER    Inhale 2 puffs into the lungs as needed for Wheezing    ATORVASTATIN (LIPITOR) 20 MG TABLET    Take 20 mg by mouth daily    BECLOMETHASONE (QVAR) 80 MCG/ACT INHALER    Inhale 1 puff into the lungs 2 times daily    CITALOPRAM (CELEXA) 10 MG TABLET    Take 10 mg by mouth daily    DICYCLOMINE (BENTYL) 20 MG TABLET    Take 20 mg by mouth 1/2 tab 4 times a week    HYDROCHLOROTHIAZIDE (HYDRODIURIL) 50 MG TABLET    Take 50 mg by mouth daily    LORATADINE (CLARITIN) 10 MG CAPS    Take 10 mg by mouth daily     PROBIOTIC PRODUCT (ALIGN PO)    Take 1 capsule by mouth daily     TRIAMCINOLONE ACETONIDE (NASACORT AQ NA)    by Nasal route 2 times daily 1 spray       ALLERGIES     Other    FAMILY HISTORY       Family History   Problem Relation Age of Onset    Cancer Mother         pancreatic    High Blood Pressure Mother     Diabetes Mother     High Blood Pressure Father     High Cholesterol Father     Alzheimer's Disease Father     Other Father         A fib    High Blood Pressure Sister     High Cholesterol Sister     Cancer Paternal Aunt         breast    Cancer Paternal Uncle         unknown    Diabetes Maternal Grandmother     Diabetes Paternal Grandfather     Heart Disease Other         fathers side          SOCIAL HISTORY       Social History     Socioeconomic History    Marital status:      Spouse name: None    Number of children: None    Years of education: None    Highest education level: None   Occupational History    None   Tobacco Use    Smoking status: Never Smoker    Smokeless tobacco: Never Used   Substance and Sexual Activity    Alcohol use: Yes     Comment: occas    Drug use: No    Sexual activity: None   Other Topics Concern    None   Social History Narrative    None     Social Determinants of Health     Financial Resource Strain:     Difficulty of Paying Living Expenses:    Food Insecurity:     Worried About is alert and oriented to person, place, and time. Cranial Nerves: No cranial nerve deficit. Sensory: No sensory deficit. Motor: No weakness. DIAGNOSTIC RESULTS     EKG: All EKG's are interpreted by the Emergency Department Physician who either signs or Co-signs this chart in the absence of a cardiologist.      RADIOLOGY:   Non-plain film images such as CT, Ultrasound and MRI are read by the radiologist. Plain radiographic images are visualized and preliminarily interpreted by the emergency physician with the below findings:      Interpretation per the Radiologist below, if available at the time of this note:    XR CHEST PORTABLE   Final Result   No acute process. ED BEDSIDE ULTRASOUND:   Performed by ED Physician - none    LABS:  Labs Reviewed   CBC WITH AUTO DIFFERENTIAL - Abnormal; Notable for the following components:       Result Value    Monocytes 13 (*)     All other components within normal limits   TROPONIN - Abnormal; Notable for the following components:    Troponin, High Sensitivity 33 (*)     All other components within normal limits   COMPREHENSIVE METABOLIC PANEL W/ REFLEX TO MG FOR LOW K - Abnormal; Notable for the following components:    Glucose 129 (*)     Chloride 93 (*)     All other components within normal limits   TROPONIN - Abnormal; Notable for the following components:    Troponin, High Sensitivity 45 (*)     All other components within normal limits   TROPONIN - Abnormal; Notable for the following components:    Troponin, High Sensitivity 64 (*)     All other components within normal limits   D-DIMER, QUANTITATIVE   PROTIME-INR   APTT       All other labs were within normal range or not returned as of this dictation.     EMERGENCY DEPARTMENT COURSE and DIFFERENTIAL DIAGNOSIS/MDM:   Vitals:    Vitals:    10/29/21 0900 10/29/21 0910 10/29/21 0911 10/29/21 1200   BP: 112/89 (!) 154/80  (!) 152/51   Pulse: 93 93 91 85   Resp: 18 14 18 16   Temp: TempSrc:       SpO2: 92% 90% 96% 94%   Weight:             MDM  Number of Diagnoses or Management Options  NSTEMI (non-ST elevated myocardial infarction) [facility])  Diagnosis management comments: 74-year-old patient who presented to the emergency department with concerns of substernal chest tightness    Diagnostic considerations-STEMI, NSTEMI, unstable angina, acute coronary syndrome, pneumothorax, pulmonary embolism, ACS syndrome    Laboratory studies, imaging studies electrocardiograms have been reviewed and discussed with patient and consultants  I have consulted Dr. Harjit Vasquez recommends patient be transferred to higher level facility    Patient has received subcu Lovenox, aspirin (prior to arrival) beta-blocker, and statin during my attendance    Patient's preference was [facility]-consultation undertaken with [facility] nurse practitioner who accepted the patient on behalf of Dr. Venkat aNnce   No current open beds patient placed on the waiting list       Amount and/or Complexity of Data Reviewed  Clinical lab tests: reviewed  Tests in the radiology section of CPT®: reviewed  Tests in the medicine section of CPT®: reviewed          REASSESSMENT     Patient remains pain-free during emergency department  ED Course as of Oct 29 1327   Thu Oct 28, 2021   1028 Performed at 1014-a ventricular rate 72, MT interval 0.198-QRS duration 0.080-QTc corrected 0.446 there is no ST segment elevation   EKG 12 Lead [RS]   1141 X-ray no acute process the radiologist read   XR CHEST PORTABLE [RS]      ED Course User Index  [RS] Nestor Ramos MD         CRITICAL CARE TIME   Total Critical Care time was minutes, excluding separately reportable procedures. There was a high probability of clinically significant/life threatening deterioration in the patient's condition which required my urgent intervention.      CONSULTS:  As documented in the HPI    PROCEDURES:  Unless otherwise noted below, none     Procedures    FINAL IMPRESSION      1. NSTEMI (non-ST elevated myocardial infarction) [facility])          DISPOSITION/PLAN   DISPOSITION Decision To Transfer 10/28/2021 05:17:45 PM      PATIENT REFERRED TO:  No follow-up provider specified. DISCHARGE MEDICATIONS:  New Prescriptions    No medications on file     Controlled Substances Monitoring:     No flowsheet data found.     (Please note that portions of this note were completed with a voice recognition program.  Efforts were made to edit the dictations but occasionally words are mis-transcribed.)    Zohaib Mckay MD (electronically signed)  Attending Emergency Physician            Zohaib Mckay MD  10/29/21 [id number] oriented to person, place, time and situation

## 2023-03-29 ENCOUNTER — TELEPHONE (OUTPATIENT)
Dept: OBGYN | Age: 68
End: 2023-03-29

## 2023-03-29 NOTE — TELEPHONE ENCOUNTER
Patient has a cyst on her right breast that is now swollen. It is not red at all, just sore. Patient scheduled for May2nd, but wanted to make sure you didn't want me to squeeze her in some where before then?

## 2023-04-17 ENCOUNTER — OFFICE VISIT (OUTPATIENT)
Dept: OBGYN | Age: 68
End: 2023-04-17
Payer: MEDICARE

## 2023-04-17 VITALS
DIASTOLIC BLOOD PRESSURE: 88 MMHG | HEIGHT: 65 IN | WEIGHT: 214 LBS | BODY MASS INDEX: 35.65 KG/M2 | SYSTOLIC BLOOD PRESSURE: 146 MMHG

## 2023-04-17 DIAGNOSIS — N60.12 FIBROCYSTIC BREAST CHANGES OF BOTH BREASTS: ICD-10-CM

## 2023-04-17 DIAGNOSIS — Z12.31 SCREENING MAMMOGRAM FOR BREAST CANCER: Primary | ICD-10-CM

## 2023-04-17 DIAGNOSIS — N60.11 FIBROCYSTIC BREAST CHANGES OF BOTH BREASTS: ICD-10-CM

## 2023-04-17 PROCEDURE — 1090F PRES/ABSN URINE INCON ASSESS: CPT | Performed by: OBSTETRICS & GYNECOLOGY

## 2023-04-17 PROCEDURE — G8417 CALC BMI ABV UP PARAM F/U: HCPCS | Performed by: OBSTETRICS & GYNECOLOGY

## 2023-04-17 PROCEDURE — G8427 DOCREV CUR MEDS BY ELIG CLIN: HCPCS | Performed by: OBSTETRICS & GYNECOLOGY

## 2023-04-17 PROCEDURE — 3077F SYST BP >= 140 MM HG: CPT | Performed by: OBSTETRICS & GYNECOLOGY

## 2023-04-17 PROCEDURE — 3079F DIAST BP 80-89 MM HG: CPT | Performed by: OBSTETRICS & GYNECOLOGY

## 2023-04-17 PROCEDURE — G8400 PT W/DXA NO RESULTS DOC: HCPCS | Performed by: OBSTETRICS & GYNECOLOGY

## 2023-04-17 PROCEDURE — 1123F ACP DISCUSS/DSCN MKR DOCD: CPT | Performed by: OBSTETRICS & GYNECOLOGY

## 2023-04-17 PROCEDURE — 1036F TOBACCO NON-USER: CPT | Performed by: OBSTETRICS & GYNECOLOGY

## 2023-04-17 PROCEDURE — 3017F COLORECTAL CA SCREEN DOC REV: CPT | Performed by: OBSTETRICS & GYNECOLOGY

## 2023-04-17 PROCEDURE — 99213 OFFICE O/P EST LOW 20 MIN: CPT | Performed by: OBSTETRICS & GYNECOLOGY

## 2023-04-17 NOTE — PROGRESS NOTES
20 minutes for the visit on this date of service by the E/M provider. The time component had both face to face and non face to face time spent in determining the total time component. Counseling and education regarding her diagnosis listed below and her options regarding those diagnoses were also included in determining her time component. Diagnosis Orders   1. Screening mammogram for breast cancer  GT TONY DIGITAL SCREEN BILATERAL      2. Fibrocystic breast changes of both breasts             The patient had her preventative health maintenance recommendations and follow-up reviewed with her at the completion of her visit. Assessment and Plan          Diagnosis Orders   1. Screening mammogram for breast cancer        2. Fibrocystic breast changes of both breasts                  I am having Muna Owens maintain her citalopram, dicyclomine, Triamcinolone Acetonide (NASACORT AQ NA), albuterol sulfate HFA, loratadine, Probiotic Product (ALIGN PO), beclomethasone, aspirin, nitroGLYCERIN, Estradiol, atorvastatin, and metoprolol succinate. Return if symptoms worsen or fail to improve. She was also counseled on her preventative health maintenance recommendations and follow-up. There are no Patient Instructions on file for this visit. Kumar Mari DO,4/17/2023 7:05 PM                 ASSESSMENT:      1. Screening mammogram for breast cancer    2. Fibrocystic breast changes of both breasts        PLAN:  No orders of the defined types were placed in this encounter. Return if symptoms worsen or fail to improve.        Electronically signed by Kumar Mari DO on 04/17/23

## 2023-05-24 ENCOUNTER — OFFICE VISIT (OUTPATIENT)
Dept: CARDIOLOGY | Age: 68
End: 2023-05-24
Payer: MEDICARE

## 2023-05-24 ENCOUNTER — HOSPITAL ENCOUNTER (OUTPATIENT)
Dept: NON INVASIVE DIAGNOSTICS | Age: 68
Discharge: HOME | End: 2023-05-24
Attending: FAMILY MEDICINE
Payer: MEDICARE

## 2023-05-24 VITALS
RESPIRATION RATE: 18 BRPM | OXYGEN SATURATION: 97 % | DIASTOLIC BLOOD PRESSURE: 73 MMHG | BODY MASS INDEX: 35.09 KG/M2 | HEIGHT: 65 IN | WEIGHT: 210.6 LBS | SYSTOLIC BLOOD PRESSURE: 122 MMHG | HEART RATE: 76 BPM

## 2023-05-24 DIAGNOSIS — R00.2 PALPITATION: ICD-10-CM

## 2023-05-24 DIAGNOSIS — I21.4 NSTEMI (NON-ST ELEVATED MYOCARDIAL INFARCTION) (HCC): ICD-10-CM

## 2023-05-24 DIAGNOSIS — I25.10 CORONARY ARTERY DISEASE INVOLVING NATIVE CORONARY ARTERY OF NATIVE HEART WITHOUT ANGINA PECTORIS: Primary | ICD-10-CM

## 2023-05-24 DIAGNOSIS — E78.2 MIXED HYPERLIPIDEMIA: ICD-10-CM

## 2023-05-24 DIAGNOSIS — I10 PRIMARY HYPERTENSION: ICD-10-CM

## 2023-05-24 PROCEDURE — G8417 CALC BMI ABV UP PARAM F/U: HCPCS | Performed by: FAMILY MEDICINE

## 2023-05-24 PROCEDURE — G8400 PT W/DXA NO RESULTS DOC: HCPCS | Performed by: FAMILY MEDICINE

## 2023-05-24 PROCEDURE — G8427 DOCREV CUR MEDS BY ELIG CLIN: HCPCS | Performed by: FAMILY MEDICINE

## 2023-05-24 PROCEDURE — 93243 EXT ECG>48HR<7D SCAN A/R: CPT

## 2023-05-24 PROCEDURE — 1123F ACP DISCUSS/DSCN MKR DOCD: CPT | Performed by: FAMILY MEDICINE

## 2023-05-24 PROCEDURE — 1036F TOBACCO NON-USER: CPT | Performed by: FAMILY MEDICINE

## 2023-05-24 PROCEDURE — 93242 EXT ECG>48HR<7D RECORDING: CPT

## 2023-05-24 PROCEDURE — 99214 OFFICE O/P EST MOD 30 MIN: CPT | Performed by: FAMILY MEDICINE

## 2023-05-24 PROCEDURE — 3078F DIAST BP <80 MM HG: CPT | Performed by: FAMILY MEDICINE

## 2023-05-24 PROCEDURE — 3017F COLORECTAL CA SCREEN DOC REV: CPT | Performed by: FAMILY MEDICINE

## 2023-05-24 PROCEDURE — 99211 OFF/OP EST MAY X REQ PHY/QHP: CPT | Performed by: FAMILY MEDICINE

## 2023-05-24 PROCEDURE — 1090F PRES/ABSN URINE INCON ASSESS: CPT | Performed by: FAMILY MEDICINE

## 2023-05-24 PROCEDURE — 3074F SYST BP LT 130 MM HG: CPT | Performed by: FAMILY MEDICINE

## 2023-05-24 RX ORDER — ACETAMINOPHEN 500 MG
1000 TABLET ORAL EVERY 6 HOURS PRN
COMMUNITY

## 2023-05-24 NOTE — PATIENT INSTRUCTIONS
SURVEY:    You may be receiving a survey from [employer] regarding your visit today. Please complete the survey to enable us to provide the highest quality of care to you and your family. If you cannot score us a very good on any question, please call the office to discuss how we could have made your experience a very good one. Thank you.

## 2023-05-24 NOTE — PROGRESS NOTES
Mariel Horan am scribing for and in the presence of Redd Hough MD, MS, F.A.C.C..    Patient: Tatum Gordon  : 1955  Date of Visit: May 24, 2023    REASON FOR VISIT / CONSULTATION: Coronary Artery Disease (HX: ASHD, HTN, HLD Pt is here today due to questions she is still having palp, has them occasionally last time was last week, only 3-4 heart beats and goes away Denies:CP,SOB, lightheaded/dizziness)    History of Present Illness:        Dear Gabby Proctor MD,    I had the pleasure of seeing Tatum Gordon in my office today. Ms. Crissy Mcnally is a 79 y.o. female with a history of atherosclerotic heart disease including a NSTEMI in . She also has a history of tachycardia back in the s. Family History: Both of Ms. Rogers's parents and sister have a history of hypertension. EKG done in office 22: Normal Sinus Rhythm. Stress test done on 9/15/22: Mildly abnormal. Echo done on 22: Left ventricle is normal in size, global left ventricular systolic functions normal, calculated ejection fraction is 75%. Normal mitral valve structure. Trivial mitral regurgitation. Ms. Crissy Mcnally is here today for a follow up. She states she is having occasional palpitations that lasts seconds and are mild. She denies any chest pain, pressure, or tightness. She denies any light headed/dizziness or any palpitations. She says she has not been sleeping very well. She is exercising 2-3 days a week without noticing any progress. Ms. Crissy Mcnally denies any chest pain now or in the recent past, increased shortness of breath, abdominal pain, bleeding problems, problems with her medications or any other concerns at this time. Exercise Tolerance: Ms. Crissy Mcnally reports that she has a fairly good exercise tolerance. She is up to 5 miles on stationary bike 2-3 days a week.      PAST MEDICAL HISTORY:         Past Medical History:   Diagnosis Date    Abnormal glucose     Allergic rhinitis     Angina

## 2023-06-08 ENCOUNTER — HOSPITAL ENCOUNTER (OUTPATIENT)
Dept: WOMENS IMAGING | Age: 68
Discharge: HOME | End: 2023-06-10
Payer: MEDICARE

## 2023-06-08 DIAGNOSIS — Z12.31 SCREENING MAMMOGRAM FOR BREAST CANCER: ICD-10-CM

## 2023-06-08 PROCEDURE — 77063 BREAST TOMOSYNTHESIS BI: CPT

## 2024-07-15 ENCOUNTER — OFFICE VISIT (OUTPATIENT)
Dept: CARDIOLOGY | Age: 69
End: 2024-07-15
Payer: MEDICARE

## 2024-07-15 VITALS
WEIGHT: 202 LBS | RESPIRATION RATE: 18 BRPM | OXYGEN SATURATION: 98 % | BODY MASS INDEX: 33.66 KG/M2 | DIASTOLIC BLOOD PRESSURE: 66 MMHG | SYSTOLIC BLOOD PRESSURE: 118 MMHG | HEIGHT: 65 IN | HEART RATE: 74 BPM

## 2024-07-15 DIAGNOSIS — R00.2 PALPITATIONS: ICD-10-CM

## 2024-07-15 DIAGNOSIS — R00.2 PALPITATION: ICD-10-CM

## 2024-07-15 DIAGNOSIS — I10 PRIMARY HYPERTENSION: ICD-10-CM

## 2024-07-15 DIAGNOSIS — I25.10 CORONARY ARTERY DISEASE INVOLVING NATIVE CORONARY ARTERY OF NATIVE HEART WITHOUT ANGINA PECTORIS: Primary | ICD-10-CM

## 2024-07-15 DIAGNOSIS — E66.9 CLASS 1 OBESITY WITH BODY MASS INDEX (BMI) OF 33.0 TO 33.9 IN ADULT, UNSPECIFIED OBESITY TYPE, UNSPECIFIED WHETHER SERIOUS COMORBIDITY PRESENT: ICD-10-CM

## 2024-07-15 DIAGNOSIS — I21.4 NSTEMI (NON-ST ELEVATED MYOCARDIAL INFARCTION) (HCC): ICD-10-CM

## 2024-07-15 DIAGNOSIS — E78.2 MIXED HYPERLIPIDEMIA: ICD-10-CM

## 2024-07-15 PROCEDURE — 1123F ACP DISCUSS/DSCN MKR DOCD: CPT | Performed by: FAMILY MEDICINE

## 2024-07-15 PROCEDURE — 3074F SYST BP LT 130 MM HG: CPT | Performed by: FAMILY MEDICINE

## 2024-07-15 PROCEDURE — G8417 CALC BMI ABV UP PARAM F/U: HCPCS | Performed by: FAMILY MEDICINE

## 2024-07-15 PROCEDURE — 99214 OFFICE O/P EST MOD 30 MIN: CPT | Performed by: FAMILY MEDICINE

## 2024-07-15 PROCEDURE — 93005 ELECTROCARDIOGRAM TRACING: CPT | Performed by: FAMILY MEDICINE

## 2024-07-15 PROCEDURE — 1090F PRES/ABSN URINE INCON ASSESS: CPT | Performed by: FAMILY MEDICINE

## 2024-07-15 PROCEDURE — G8400 PT W/DXA NO RESULTS DOC: HCPCS | Performed by: FAMILY MEDICINE

## 2024-07-15 PROCEDURE — 99211 OFF/OP EST MAY X REQ PHY/QHP: CPT | Performed by: FAMILY MEDICINE

## 2024-07-15 PROCEDURE — 3078F DIAST BP <80 MM HG: CPT | Performed by: FAMILY MEDICINE

## 2024-07-15 PROCEDURE — 93010 ELECTROCARDIOGRAM REPORT: CPT | Performed by: FAMILY MEDICINE

## 2024-07-15 PROCEDURE — 1036F TOBACCO NON-USER: CPT | Performed by: FAMILY MEDICINE

## 2024-07-15 PROCEDURE — G8427 DOCREV CUR MEDS BY ELIG CLIN: HCPCS | Performed by: FAMILY MEDICINE

## 2024-07-15 PROCEDURE — 3017F COLORECTAL CA SCREEN DOC REV: CPT | Performed by: FAMILY MEDICINE

## 2024-07-15 NOTE — PROGRESS NOTES
yes
of gross cranial nerve deficit. Coordination appeared normal.   Skin: Skin is warm and dry. There is no rash or diaphoresis.   Psychiatric: She has a normal mood and affect. Her speech is normal and behavior is normal.      MOST RECENT LABS ON RECORD:   Lab Results   Component Value Date    WBC 7.0 10/31/2021    HGB 12.1 10/31/2021    HCT 37.0 10/31/2021     10/31/2021    CHOL 182 10/30/2021    TRIG 103 10/30/2021    HDL 57 10/30/2021    ALT 17 10/31/2021    AST 24 10/31/2021     11/02/2021    K 4.0 11/02/2021     11/02/2021    CREATININE 0.65 11/02/2021    BUN 14 11/02/2021    CO2 21 11/02/2021    TSH 1.80 03/29/2022    INR 1.0 10/28/2021     ASSESSMENT:     1. Coronary artery disease involving native coronary artery of native heart without angina pectoris    2. NSTEMI (non-ST elevated myocardial infarction) (HCC)    3. Primary hypertension    4. Mixed hyperlipidemia    5. Palpitation    6. Palpitations    7. Class 1 obesity with body mass index (BMI) of 33.0 to 33.9 in adult, unspecified obesity type, unspecified whether serious comorbidity present      PLAN:        Atherosclerotic Heart Disease including history of NSTEMI back in 11/21. heart catheterization done but no stenting done at that time.  Antiplatelet Agent: Continue Aspirin 81 mg daily.   Beta Blocker: Continue Metoprolol succinate (Toprol XL) 25 mg daily.   Anti-anginal medications: Continue nitroglycerin 0.4 mg tablets as needed for chest pain.  Cholesterol Reduction Therapy: Continue Atorvastatin (Lipitor) 40 mg daily.    Additional counseling: I advised them to call our office or go to the emergency room if they developed worsening or persistent chest pain or increased shortness of breath as this could be life threatening.     Essential Hypertension: Controlled  Beta Blocker: Continue Metoprolol succinate (Toprol XL) 25 mg daily.   ACE Inibitor/ARB: Not indicated at this time.   Calcium Channel Blocker: Not indicated at this

## 2024-07-31 ENCOUNTER — HOSPITAL ENCOUNTER (OUTPATIENT)
Dept: WOMENS IMAGING | Age: 69
Discharge: HOME | End: 2024-08-02
Payer: MEDICARE

## 2024-07-31 DIAGNOSIS — Z12.31 VISIT FOR SCREENING MAMMOGRAM: ICD-10-CM

## 2024-07-31 PROCEDURE — 77063 BREAST TOMOSYNTHESIS BI: CPT

## 2024-09-03 ENCOUNTER — HOSPITAL ENCOUNTER (OUTPATIENT)
Age: 69
Setting detail: SPECIMEN
Discharge: HOME | End: 2024-09-03
Payer: MEDICARE

## 2024-09-03 ENCOUNTER — OFFICE VISIT (OUTPATIENT)
Dept: OBGYN | Age: 69
End: 2024-09-03
Payer: MEDICARE

## 2024-09-03 VITALS
WEIGHT: 201 LBS | BODY MASS INDEX: 33.49 KG/M2 | HEIGHT: 65 IN | DIASTOLIC BLOOD PRESSURE: 78 MMHG | SYSTOLIC BLOOD PRESSURE: 128 MMHG

## 2024-09-03 DIAGNOSIS — Z01.419 WOMEN'S ANNUAL ROUTINE GYNECOLOGICAL EXAMINATION: ICD-10-CM

## 2024-09-03 DIAGNOSIS — Z01.419 WOMEN'S ANNUAL ROUTINE GYNECOLOGICAL EXAMINATION: Primary | ICD-10-CM

## 2024-09-03 PROCEDURE — 87624 HPV HI-RISK TYP POOLED RSLT: CPT

## 2024-09-03 PROCEDURE — G0145 SCR C/V CYTO,THINLAYER,RESCR: HCPCS

## 2024-09-03 SDOH — ECONOMIC STABILITY: FOOD INSECURITY: WITHIN THE PAST 12 MONTHS, THE FOOD YOU BOUGHT JUST DIDN'T LAST AND YOU DIDN'T HAVE MONEY TO GET MORE.: NEVER TRUE

## 2024-09-03 SDOH — ECONOMIC STABILITY: FOOD INSECURITY: WITHIN THE PAST 12 MONTHS, YOU WORRIED THAT YOUR FOOD WOULD RUN OUT BEFORE YOU GOT MONEY TO BUY MORE.: NEVER TRUE

## 2024-09-03 SDOH — ECONOMIC STABILITY: INCOME INSECURITY: HOW HARD IS IT FOR YOU TO PAY FOR THE VERY BASICS LIKE FOOD, HOUSING, MEDICAL CARE, AND HEATING?: NOT HARD AT ALL

## 2024-09-03 ASSESSMENT — ENCOUNTER SYMPTOMS
DIARRHEA: 0
SHORTNESS OF BREATH: 0
ABDOMINAL PAIN: 0
CONSTIPATION: 0

## 2024-09-03 ASSESSMENT — PATIENT HEALTH QUESTIONNAIRE - PHQ9
SUM OF ALL RESPONSES TO PHQ QUESTIONS 1-9: 2
SUM OF ALL RESPONSES TO PHQ9 QUESTIONS 1 & 2: 2
1. LITTLE INTEREST OR PLEASURE IN DOING THINGS: SEVERAL DAYS
SUM OF ALL RESPONSES TO PHQ QUESTIONS 1-9: 2
2. FEELING DOWN, DEPRESSED OR HOPELESS: SEVERAL DAYS

## 2024-09-03 NOTE — PROGRESS NOTES
(9/3/2024 10:06 AM)        NURSE: Carole STEPHENSON      HPI: pt here for annual exam    Review of Systems   Constitutional:  Negative for chills, fatigue and fever.   Respiratory:  Negative for shortness of breath.    Cardiovascular:  Negative for chest pain.   Gastrointestinal:  Negative for abdominal pain, constipation and diarrhea.   Genitourinary:  Negative for dysuria, enuresis, frequency, menstrual problem, pelvic pain, urgency and vaginal bleeding.   Neurological:  Negative for dizziness, light-headedness and headaches.         Objective  Physical Exam  Constitutional:       Appearance: Normal appearance.   Genitourinary:      Vulva, bladder and urethral meatus normal.      Right Labia: No rash or lesions.     Left Labia: No lesions or rash.     No labial fusion noted.      No vaginal discharge.      No vaginal prolapse present.     Severe vaginal atrophy present.       Right Adnexa: not tender and no mass present.     Left Adnexa: not tender and no mass present.     No cervical motion tenderness, discharge, friability or lesion.      No parametrium nodularity present.     Uterus is not enlarged or tender.   Breasts:     Breasts are soft.     Right: No inverted nipple, mass, nipple discharge, skin change or tenderness.      Left: No inverted nipple, mass, nipple discharge, skin change or tenderness.   HENT:      Head: Normocephalic and atraumatic.   Eyes:      Extraocular Movements: Extraocular movements intact.      Pupils: Pupils are equal, round, and reactive to light.   Cardiovascular:      Rate and Rhythm: Normal rate.   Pulmonary:      Effort: Pulmonary effort is normal.   Abdominal:      General: There is no distension.      Palpations: Abdomen is soft. There is no mass.      Tenderness: There is no abdominal tenderness.   Musculoskeletal:         General: Normal range of motion.      Cervical back: Normal range of motion.   Neurological:      General: No focal deficit present.      Mental Status: She is

## 2024-09-05 LAB
HPV I/H RISK 4 DNA CVX QL NAA+PROBE: NOT DETECTED
HPV SAMPLE: NORMAL
HPV, INTERPRETATION: NORMAL
HPV16 DNA CVX QL NAA+PROBE: NOT DETECTED
HPV18 DNA CVX QL NAA+PROBE: NOT DETECTED
SPECIMEN DESCRIPTION: NORMAL

## 2024-09-12 LAB — CYTOLOGY REPORT: NORMAL

## 2025-01-12 NOTE — ED NOTES
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 complexity in care, I will continue to support Ana in the subsequent management of this condition(s) and with the ongoing continuity of care of this condition(s).   No LOS data to display       ***

## 2025-05-19 NOTE — PLAN OF CARE
Problem: SAFETY  Goal: Free from accidental physical injury  Outcome: Ongoing  Goal: Free from intentional harm  Outcome: Ongoing     Problem: DAILY CARE  Goal: Daily care needs are met  Outcome: Ongoing     Problem: PAIN  Goal: Patient's pain/discomfort is manageable  Outcome: Ongoing     Problem: SKIN INTEGRITY  Goal: Skin integrity is maintained or improved  Outcome: Ongoing     Problem: KNOWLEDGE DEFICIT  Goal: Patient/S.O. demonstrates understanding of disease process, treatment plan, medications, and discharge instructions.   Outcome: Ongoing     Problem: DISCHARGE BARRIERS  Goal: Patient's continuum of care needs are met  Outcome: Ongoing Subjective   Yoselin Gerard is a 41 y.o. female. Presents to [facility]    Chief Complaint   Patient presents with    Ear Fullness       Ear Fullness  Affected ear:  Left  Chronicity:  New  Onset:  More than 1 month ago  Progression since onset:  Coming and going  Frequency:  Intermittently  Fever:  No fever  Pain - numeric:  0/10  Pain severity:  No pain  Associated symptoms: no dizziness, no cough, no tinnitus, no drainage, no headaches, no hearing loss, no neck pain, no rash, no sore throat, no hoarse voice and no congestion    Treatments tried:  Antibiotics     Her ears are feeling much better      I personally reviewed and updated the patient's allergies, medications, problem list, and past medical, surgical, social, and family history. I have reviewed and confirmed the accuracy of the History of Present Illness and Review of Symptoms as documented by the MA/LPN/RN. Josefa Skelton MD    Allergies:  Allergies   Allergen Reactions    Penicillins Rash and Swelling    Hydrocodone Other (See Comments)     Numbness       Social History:  Social History     Socioeconomic History    Marital status:    Tobacco Use    Smoking status: Never     Passive exposure: Never    Smokeless tobacco: Never   Vaping Use    Vaping status: Never Used   Substance and Sexual Activity    Alcohol use: Not Currently    Drug use: Not Currently    Sexual activity: Defer       Family History:  Family History   Problem Relation Age of Onset    Heart attack Mother     Heart disease Mother     Heart disease Maternal Grandfather        Past Medical History :  Patient Active Problem List   Diagnosis    Anemia    Anxiety    Moderate episode of recurrent major depressive disorder    Gastroesophageal reflux disease, esophagitis presence not specified    Heartburn    Hypertension, benign    Moderate persistent asthma without complication    Plantar fasciitis    Palpitations    Osteoarthritis    Lumbosacral radiculopathy     Lumbar disc herniation with radiculopathy    Lower extremity edema    Chronic right-sided low back pain with right-sided sciatica    Excessive sweating    Encounter for general adult medical examination with abnormal findings    Hot flashes    Migraine with aura and without status migrainosus, not intractable    Class 3 severe obesity due to excess calories with serious comorbidity and body mass index (BMI) of 60.0 to 69.9 in adult    Skin lesion of neck    Mixed hyperlipidemia    Pap smear of cervix with ASCUS, cannot exclude HGSIL    Rh D negative blood type    Chlamydia vaginitis/cervicitis    Irregular menses    Type 2 diabetes mellitus with hyperglycemia, without long-term current use of insulin    Atypical chest pain    Lung nodule    Nevus    Rash    Aphthous ulcer of tongue    Eustachian tube disorder       Medication List:    Current Outpatient Medications:     Blood Glucose Monitoring Suppl (FreeStyle Lite) device, Use 1 each Daily., Disp: 1 each, Rfl: 0    citalopram (CeleXA) 20 MG tablet, Take 1 tablet by mouth Daily., Disp: 90 tablet, Rfl: 3    clobetasol (TEMOVATE) 0.05 % external solution, Please see attached for detailed directions, Disp: , Rfl:     glucose blood test strip, 1 each by Other route Daily. Use as instructed, Disp: 100 each, Rfl: 3    glucose monitor monitoring kit, Use 1 each Daily., Disp: 1 each, Rfl: 0    ibuprofen (ADVIL,MOTRIN) 600 MG tablet, Take 1 tablet by mouth 3 times a day., Disp: , Rfl:     ketoconazole (NIZORAL) 2 % shampoo, Please see attached for detailed directions, Disp: , Rfl:     Lancets (freestyle) lancets, 1 each by Other route Daily., Disp: 100 each, Rfl: 3    losartan (COZAAR) 25 MG tablet, Take 0.5 tablets by mouth Daily., Disp: 45 tablet, Rfl: 3    metFORMIN (GLUCOPHAGE) 500 MG tablet, Take 1 tablet by mouth 2 (Two) Times a Day With Meals., Disp: 180 tablet, Rfl: 3    mupirocin (BACTROBAN) 2 % cream, Apply 1 Application topically to the appropriate area as  "directed 3 (Three) Times a Day., Disp: 1 each, Rfl: 0    rosuvastatin (CRESTOR) 20 MG tablet, Take 1 tablet by mouth Every Night., Disp: 90 tablet, Rfl: 3    sulfamethoxazole-trimethoprim (BACTRIM DS,SEPTRA DS) 800-160 MG per tablet, Take 1 tablet by mouth 2 (Two) Times a Day., Disp: 20 tablet, Rfl: 0    topiramate (TOPAMAX) 25 MG tablet, Take 1 tablet by mouth Every 12 (Twelve) Hours., Disp: , Rfl:     valACYclovir (VALTREX) 1000 MG tablet, Take 1 tablet by mouth Every 12 (Twelve) Hours., Disp: 20 tablet, Rfl: 0    vitamin B-12 (CYANOCOBALAMIN) 1000 MCG tablet, Take 1 tablet by mouth Daily., Disp: , Rfl:     Past Surgical History:  Past Surgical History:   Procedure Laterality Date    CARPAL TUNNEL RELEASE       SECTION      3    TUBAL ABDOMINAL LIGATION           Physical Exam:      Vital Signs:    Vitals:    25 1600   BP: 124/76   Pulse: 84   Resp: 18   Temp: 97.8 °F (36.6 °C)   SpO2: 98%        /76   Pulse 84   Temp 97.8 °F (36.6 °C) (Temporal)   Resp 18   Ht 157.5 cm (62.01\")   Wt (!) 165 kg (363 lb 12.8 oz)   LMP 2025 (Exact Date)   SpO2 98%   BMI 66.52 kg/m²     Wt Readings from Last 3 Encounters:   25 (!) 165 kg (363 lb 12.8 oz)   25 (!) 164 kg (362 lb 6.4 oz)   25 (!) 167 kg (368 lb 3.2 oz)       Result Review :                Physical Exam  Vitals reviewed.   Constitutional:       Appearance: Normal appearance. She is well-developed.   HENT:      Head: Normocephalic and atraumatic.   Eyes:      General:         Right eye: No discharge.         Left eye: No discharge.   Cardiovascular:      Rate and Rhythm: Normal rate and regular rhythm.      Heart sounds: Normal heart sounds. No murmur heard.     No friction rub. No gallop.   Pulmonary:      Effort: Pulmonary effort is normal. No respiratory distress.      Breath sounds: Normal breath sounds. No wheezing or rales.   Skin:     General: Skin is warm and dry.      Findings: No rash.   Neurological:      " Mental Status: She is alert and oriented to person, place, and time.      Coordination: Coordination normal.      Gait: Gait normal.   Psychiatric:         Behavior: Behavior is cooperative.         Assessment and Plan:  Problems Addressed this Visit          ENT    Aphthous ulcer of tongue - Primary    Better she says         Eustachian tube disorder    Much better  Finish antibiotic            Endocrine and Metabolic    Class 3 severe obesity due to excess calories with serious comorbidity and body mass index (BMI) of 60.0 to 69.9 in adult    Patient's (Body mass index is 66.52 kg/m².) indicates that they are morbidly/severely obese (BMI > 40 or > 35 with obesity - related health condition) with health conditions that include diabetes mellitus . Weight is worsening. BMI  is above average; BMI management plan is completed. We discussed low calorie, low carb based diet program, portion control, and increasing exercise.           Other Visit Diagnoses         Ear fullness, left              Diagnoses         Codes Comments      Aphthous ulcer of tongue    -  Primary ICD-10-CM: K12.0  ICD-9-CM: 528.2       Ear fullness, left     ICD-10-CM: H93.8X2  ICD-9-CM: 388.8       Class 3 severe obesity due to excess calories with serious comorbidity and body mass index (BMI) of 60.0 to 69.9 in adult     ICD-10-CM: E66.813, E66.01, Z68.44  ICD-9-CM: 278.01, V85.44       Disorder of both eustachian tubes     ICD-10-CM: H69.93  ICD-9-CM: 381.9                          An After Visit Summary and PPPS were given to the patient.       This document is intended for medical expert use only. Reading of this document by patients and/or patient's family without participating medical staff guidance may result in misinterpretation and unintended morbidity. Any interpretation of such data is the responsibility of the patient and/or family member responsible for the patient in concert with their primary or specialist providers, not to be left  for sources of online searches such as [employer], [employer] or similar queries. Relying on these approaches to knowledge may result in misinterpretation, misguided goals of care and even death should patients or family members try recommendations outside of the realm of professional medical care.

## 2025-07-28 ENCOUNTER — OFFICE VISIT (OUTPATIENT)
Dept: CARDIOLOGY | Age: 70
End: 2025-07-28
Payer: MEDICARE

## 2025-07-28 ENCOUNTER — RESULTS FOLLOW-UP (OUTPATIENT)
Dept: CARDIOLOGY | Age: 70
End: 2025-07-28

## 2025-07-28 ENCOUNTER — HOSPITAL ENCOUNTER (OUTPATIENT)
Age: 70
Discharge: HOME | End: 2025-07-28
Payer: MEDICARE

## 2025-07-28 ENCOUNTER — HOSPITAL ENCOUNTER (OUTPATIENT)
Age: 70
Discharge: HOME | End: 2025-07-30
Payer: MEDICARE

## 2025-07-28 VITALS
HEIGHT: 65 IN | OXYGEN SATURATION: 98 % | DIASTOLIC BLOOD PRESSURE: 77 MMHG | WEIGHT: 208 LBS | BODY MASS INDEX: 34.66 KG/M2 | HEART RATE: 79 BPM | RESPIRATION RATE: 18 BRPM | SYSTOLIC BLOOD PRESSURE: 135 MMHG

## 2025-07-28 DIAGNOSIS — E78.2 MIXED HYPERLIPIDEMIA: ICD-10-CM

## 2025-07-28 DIAGNOSIS — I25.2 HISTORY OF NON-ST ELEVATION MYOCARDIAL INFARCTION (NSTEMI): ICD-10-CM

## 2025-07-28 DIAGNOSIS — R00.2 PALPITATIONS: ICD-10-CM

## 2025-07-28 DIAGNOSIS — I25.10 ASHD (ARTERIOSCLEROTIC HEART DISEASE): ICD-10-CM

## 2025-07-28 DIAGNOSIS — E66.811 CLASS 1 OBESITY IN ADULT, UNSPECIFIED BMI, UNSPECIFIED OBESITY TYPE, UNSPECIFIED WHETHER SERIOUS COMORBIDITY PRESENT: ICD-10-CM

## 2025-07-28 DIAGNOSIS — I10 PRIMARY HYPERTENSION: ICD-10-CM

## 2025-07-28 DIAGNOSIS — R07.9 CHEST PAIN, UNSPECIFIED TYPE: ICD-10-CM

## 2025-07-28 DIAGNOSIS — R06.02 SHORTNESS OF BREATH: ICD-10-CM

## 2025-07-28 DIAGNOSIS — R07.89 ATYPICAL CHEST PAIN: ICD-10-CM

## 2025-07-28 DIAGNOSIS — G47.33 OSA (OBSTRUCTIVE SLEEP APNEA): ICD-10-CM

## 2025-07-28 DIAGNOSIS — R07.89 ATYPICAL CHEST PAIN: Primary | ICD-10-CM

## 2025-07-28 DIAGNOSIS — R53.83 OTHER FATIGUE: ICD-10-CM

## 2025-07-28 LAB
ECHO BSA: 2.08 M2
TSH SERPL DL<=0.05 MIU/L-ACNC: 1.49 UIU/ML (ref 0.27–4.2)

## 2025-07-28 PROCEDURE — 3017F COLORECTAL CA SCREEN DOC REV: CPT | Performed by: NURSE PRACTITIONER

## 2025-07-28 PROCEDURE — 93005 ELECTROCARDIOGRAM TRACING: CPT | Performed by: NURSE PRACTITIONER

## 2025-07-28 PROCEDURE — G8400 PT W/DXA NO RESULTS DOC: HCPCS | Performed by: NURSE PRACTITIONER

## 2025-07-28 PROCEDURE — 36415 COLL VENOUS BLD VENIPUNCTURE: CPT

## 2025-07-28 PROCEDURE — 1123F ACP DISCUSS/DSCN MKR DOCD: CPT | Performed by: NURSE PRACTITIONER

## 2025-07-28 PROCEDURE — 84443 ASSAY THYROID STIM HORMONE: CPT

## 2025-07-28 PROCEDURE — 93010 ELECTROCARDIOGRAM REPORT: CPT | Performed by: NURSE PRACTITIONER

## 2025-07-28 PROCEDURE — 1090F PRES/ABSN URINE INCON ASSESS: CPT | Performed by: NURSE PRACTITIONER

## 2025-07-28 PROCEDURE — 1159F MED LIST DOCD IN RCRD: CPT | Performed by: NURSE PRACTITIONER

## 2025-07-28 PROCEDURE — 3078F DIAST BP <80 MM HG: CPT | Performed by: NURSE PRACTITIONER

## 2025-07-28 PROCEDURE — 93243 EXT ECG>48HR<7D SCAN A/R: CPT

## 2025-07-28 PROCEDURE — 3075F SYST BP GE 130 - 139MM HG: CPT | Performed by: NURSE PRACTITIONER

## 2025-07-28 PROCEDURE — 99214 OFFICE O/P EST MOD 30 MIN: CPT | Performed by: NURSE PRACTITIONER

## 2025-07-28 PROCEDURE — 1160F RVW MEDS BY RX/DR IN RCRD: CPT | Performed by: NURSE PRACTITIONER

## 2025-07-28 PROCEDURE — G8417 CALC BMI ABV UP PARAM F/U: HCPCS | Performed by: NURSE PRACTITIONER

## 2025-07-28 PROCEDURE — G8427 DOCREV CUR MEDS BY ELIG CLIN: HCPCS | Performed by: NURSE PRACTITIONER

## 2025-07-28 PROCEDURE — 1036F TOBACCO NON-USER: CPT | Performed by: NURSE PRACTITIONER

## 2025-07-28 RX ORDER — M-VIT,TX,IRON,MINS/CALC/FOLIC 27MG-0.4MG
1 TABLET ORAL DAILY
COMMUNITY

## 2025-07-28 NOTE — PROGRESS NOTES
Patient: Laureen Rogers  : 1955  Date of Visit: 2025    REASON FOR VISIT / CONSULTATION: Coronary Artery Disease (HX: ASHD, HTN, HLD. Yearly follow up. )    History of Present Illness:        Dear Westley Jackson MD,     I had the pleasure of seeing Laureen Rogers in my office today. Ms. Rogers is a 70 y.o. female with a history of atherosclerotic heart disease including a NSTEMI in .  Heart catheterization done  showed mild coronary artery disease in LAD 10-20%. She also has a history of tachycardia back in the s. Family History: Both of Ms. Rogers's parents and sister have a history of hypertension. EKG done in office 22: Normal Sinus Rhythm. Stress test done on 9/15/22: Mildly abnormal. Echo done on 22: Left ventricle is normal in size, global left ventricular systolic functions normal, calculated ejection fraction is 75%. Normal mitral valve structure. Trivial mitral regurgitation. CAM monitor done on 23: Predominant rhythm: Normal sinus rhythm. Atrial tachycardia (AT) 1 episode, longest/fastest 8 beats at average 112 bpm up to 135 bpm. PAC <0.1%.    EKG done today 2025 showed normal sinus rhythm-no acute ischemic changes noted.     Ms. Rogers is here today for a 1 year follow up. She says that she has been doing ok since last visit. However reports that she has been experiencing shortness of breath along with chest tightness when she is walking up the stairs that occurs around twice a week. She says that it does take her about five minutes to recover once reaching the top of the stairs and there are 12 steps to her stairwell.  She says that she is limited in physical mobility because of her hip and knee pain as well as some back pain. She has been getting injections for her knee due to a torn a mensiscus and gets these every three months, which do help some. She reports that she did get a hip injection as well because of bursitis in the hip.

## 2025-07-28 NOTE — RESULT ENCOUNTER NOTE
Please let patient know thyroid function is normal. Please continue current treatment and follow up as discussed in the office. Please call with questions and concerns. Thank you

## 2025-07-28 NOTE — TELEPHONE ENCOUNTER
----- Message from NATAN Trejo CNP sent at 7/28/2025  4:16 PM EDT -----  Please let patient know thyroid function is normal. Please continue current treatment and follow up as discussed in the office. Please call with questions and concerns. Thank you

## 2025-07-29 ENCOUNTER — HOSPITAL ENCOUNTER (OUTPATIENT)
Dept: SLEEP CENTER | Age: 70
Discharge: HOME | End: 2025-07-29
Payer: MEDICARE

## 2025-07-29 DIAGNOSIS — E66.811 CLASS 1 OBESITY IN ADULT, UNSPECIFIED BMI, UNSPECIFIED OBESITY TYPE, UNSPECIFIED WHETHER SERIOUS COMORBIDITY PRESENT: ICD-10-CM

## 2025-07-29 DIAGNOSIS — I10 PRIMARY HYPERTENSION: ICD-10-CM

## 2025-07-29 DIAGNOSIS — I25.10 ASHD (ARTERIOSCLEROTIC HEART DISEASE): ICD-10-CM

## 2025-07-29 DIAGNOSIS — I25.2 HISTORY OF NON-ST ELEVATION MYOCARDIAL INFARCTION (NSTEMI): ICD-10-CM

## 2025-07-29 DIAGNOSIS — R06.02 SHORTNESS OF BREATH: ICD-10-CM

## 2025-07-29 DIAGNOSIS — G47.33 OSA (OBSTRUCTIVE SLEEP APNEA): ICD-10-CM

## 2025-07-29 DIAGNOSIS — R53.83 OTHER FATIGUE: ICD-10-CM

## 2025-07-29 DIAGNOSIS — E78.2 MIXED HYPERLIPIDEMIA: ICD-10-CM

## 2025-07-29 DIAGNOSIS — R07.89 ATYPICAL CHEST PAIN: ICD-10-CM

## 2025-07-29 DIAGNOSIS — R00.2 PALPITATIONS: ICD-10-CM

## 2025-07-29 PROCEDURE — 95806 SLEEP STUDY UNATT&RESP EFFT: CPT

## 2025-07-29 ASSESSMENT — SLEEP AND FATIGUE QUESTIONNAIRES
HOW LIKELY ARE YOU TO NOD OFF OR FALL ASLEEP WHILE LYING DOWN TO REST IN THE AFTERNOON WHEN CIRCUMSTANCES PERMIT: SLIGHT CHANCE OF DOZING
HAS ANYONE NOTICED THAT YOU QUIT BREATHING DURING SLEEP: NEVER/ALMOST NEVER
SNORING VOLUME: AS LOUD AS TALKING
HOW LIKELY ARE YOU TO NOD OFF OR FALL ASLEEP WHILE SITTING QUIETLY AFTER LUNCH WITHOUT ALCOHOL: WOULD NEVER DOZE
HOW OFTEN DO YOU SNORE: ALMOST DAILY
WHAT TIME DO YOU USUALLY WAKE UP: 28800
NUMBER OF TIMES YOU WAKE PER NIGHT: 1
ARE YOU TIRED AFTER SLEEPING: 1-2 TIME A WEEK
ARE YOU TIRED DURING WAKE TIME: 1-2 TIMES A WEEK
HOW LIKELY ARE YOU TO NOD OFF OR FALL ASLEEP WHEN YOU ARE A PASSENGER IN A CAR FOR AN HOUR WITHOUT A BREAK: WOULD NEVER DOZE
HOW MANY NAPS DO YOU TAKE PER WEEK: 3
DOES YOUR SNORING BOTHER OTHERS: YES
HOW LIKELY ARE YOU TO NOD OFF OR FALL ASLEEP WHILE SITTING AND READING: MODERATE CHANCE OF DOZING
HOW LIKELY ARE YOU TO NOD OFF OR FALL ASLEEP IN A CAR, WHILE STOPPED FOR A FEW MINUTES IN TRAFFIC: WOULD NEVER DOZE
ESS TOTAL SCORE: 4
HOW LIKELY ARE YOU TO NOD OFF OR FALL ASLEEP WHILE SITTING AND TALKING TO SOMEONE: WOULD NEVER DOZE
WHAT TIME DO YOU USUALLY GO TO BED: 82800
DO YOU SNORE: YES
NORMAL AMOUNT OF SLEEP PER NIGHT: 8
HOW LIKELY ARE YOU TO NOD OFF OR FALL ASLEEP WHILE WATCHING TV: SLIGHT CHANCE OF DOZING
HOW LIKELY ARE YOU TO NOD OFF OR FALL ASLEEP WHILE SITTING INACTIVE IN A PUBLIC PLACE: WOULD NEVER DOZE
USUAL AMOUNT OF TIME TO FALL ASLEEP (MIN): 30

## 2025-08-05 LAB — ECHO BSA: 2.08 M2

## 2025-08-14 ENCOUNTER — HOSPITAL ENCOUNTER (OUTPATIENT)
Age: 70
Discharge: HOME | End: 2025-08-16
Payer: MEDICARE

## 2025-08-14 ENCOUNTER — HOSPITAL ENCOUNTER (OUTPATIENT)
Dept: NUCLEAR MEDICINE | Age: 70
Discharge: HOME | End: 2025-08-16
Payer: MEDICARE

## 2025-08-14 VITALS
DIASTOLIC BLOOD PRESSURE: 77 MMHG | WEIGHT: 207.89 LBS | HEIGHT: 65 IN | SYSTOLIC BLOOD PRESSURE: 135 MMHG | BODY MASS INDEX: 34.64 KG/M2

## 2025-08-14 DIAGNOSIS — I10 PRIMARY HYPERTENSION: ICD-10-CM

## 2025-08-14 DIAGNOSIS — E78.2 MIXED HYPERLIPIDEMIA: ICD-10-CM

## 2025-08-14 DIAGNOSIS — R06.02 SHORTNESS OF BREATH: ICD-10-CM

## 2025-08-14 DIAGNOSIS — R00.2 PALPITATIONS: ICD-10-CM

## 2025-08-14 DIAGNOSIS — R07.89 ATYPICAL CHEST PAIN: ICD-10-CM

## 2025-08-14 DIAGNOSIS — I25.2 HISTORY OF NON-ST ELEVATION MYOCARDIAL INFARCTION (NSTEMI): ICD-10-CM

## 2025-08-14 DIAGNOSIS — E66.811 CLASS 1 OBESITY IN ADULT, UNSPECIFIED BMI, UNSPECIFIED OBESITY TYPE, UNSPECIFIED WHETHER SERIOUS COMORBIDITY PRESENT: ICD-10-CM

## 2025-08-14 DIAGNOSIS — I25.10 ASHD (ARTERIOSCLEROTIC HEART DISEASE): ICD-10-CM

## 2025-08-14 DIAGNOSIS — G47.33 OSA (OBSTRUCTIVE SLEEP APNEA): ICD-10-CM

## 2025-08-14 DIAGNOSIS — R53.83 OTHER FATIGUE: ICD-10-CM

## 2025-08-14 LAB
ECHO AO SINUS VALSALVA DIAM: 2.7 CM
ECHO AO SINUS VALSALVA INDEX: 1.34 CM/M2
ECHO AO ST JNCT DIAM: 2.2 CM
ECHO AV CUSP MM: 1.6 CM
ECHO AV MEAN GRADIENT: 4 MMHG
ECHO AV MEAN VELOCITY: 0.9 M/S
ECHO AV PEAK GRADIENT: 7 MMHG
ECHO AV PEAK VELOCITY: 1.3 M/S
ECHO AV VELOCITY RATIO: 1.08
ECHO AV VTI: 30.5 CM
ECHO BSA: 2.08 M2
ECHO EST RA PRESSURE: 3 MMHG
ECHO LA AREA 2C: 16.2 CM2
ECHO LA AREA 4C: 14.9 CM2
ECHO LA MAJOR AXIS: 5.1 CM
ECHO LA MINOR AXIS: 5.4 CM
ECHO LA VOL BP: 38 ML (ref 22–52)
ECHO LA VOL MOD A2C: 39 ML (ref 22–52)
ECHO LA VOL MOD A4C: 35 ML (ref 22–52)
ECHO LA VOL/BSA BIPLANE: 19 ML/M2 (ref 16–34)
ECHO LA VOLUME INDEX MOD A2C: 19 ML/M2 (ref 16–34)
ECHO LA VOLUME INDEX MOD A4C: 17 ML/M2 (ref 16–34)
ECHO LV E' LATERAL VELOCITY: 9.25 CM/S
ECHO LV EDV A2C: 46 ML
ECHO LV EDV A4C: 44 ML
ECHO LV EDV INDEX A4C: 22 ML/M2
ECHO LV EDV NDEX A2C: 23 ML/M2
ECHO LV EF PHYSICIAN: 60 %
ECHO LV EJECTION FRACTION A2C: 69 %
ECHO LV EJECTION FRACTION A4C: 62 %
ECHO LV EJECTION FRACTION BIPLANE: 65 % (ref 55–100)
ECHO LV ESV A2C: 15 ML
ECHO LV ESV A4C: 17 ML
ECHO LV ESV INDEX A2C: 7 ML/M2
ECHO LV ESV INDEX A4C: 8 ML/M2
ECHO LV FRACTIONAL SHORTENING: 37 % (ref 28–44)
ECHO LV INTERNAL DIMENSION DIASTOLE INDEX: 2.14 CM/M2
ECHO LV INTERNAL DIMENSION DIASTOLIC: 4.3 CM (ref 3.9–5.3)
ECHO LV INTERNAL DIMENSION SYSTOLIC INDEX: 1.34 CM/M2
ECHO LV INTERNAL DIMENSION SYSTOLIC: 2.7 CM
ECHO LV IVSD: 0.8 CM (ref 0.6–0.9)
ECHO LV MASS 2D: 105.3 G (ref 67–162)
ECHO LV MASS INDEX 2D: 52.4 G/M2 (ref 43–95)
ECHO LV POSTERIOR WALL DIASTOLIC: 0.8 CM (ref 0.6–0.9)
ECHO LV RELATIVE WALL THICKNESS RATIO: 0.37
ECHO LVOT AV VTI INDEX: 1
ECHO LVOT MEAN GRADIENT: 4 MMHG
ECHO LVOT PEAK GRADIENT: 7 MMHG
ECHO LVOT PEAK VELOCITY: 1.4 M/S
ECHO LVOT VTI: 30.4 CM
ECHO MV A VELOCITY: 0.96 M/S
ECHO MV E DECELERATION TIME (DT): 209 MS
ECHO MV E VELOCITY: 1.03 M/S
ECHO MV E/A RATIO: 1.07
ECHO MV E/E' LATERAL: 11.14
ECHO PV MAX VELOCITY: 1 M/S
ECHO PV PEAK GRADIENT: 4 MMHG
ECHO RIGHT VENTRICULAR SYSTOLIC PRESSURE (RVSP): 29 MMHG
ECHO RV TAPSE: 3.5 CM (ref 1.7–?)
ECHO TV REGURGITANT MAX VELOCITY: 2.55 M/S
ECHO TV REGURGITANT PEAK GRADIENT: 26 MMHG

## 2025-08-14 PROCEDURE — 93017 CV STRESS TEST TRACING ONLY: CPT

## 2025-08-14 PROCEDURE — 3430000000 HC RX DIAGNOSTIC RADIOPHARMACEUTICAL: Performed by: NURSE PRACTITIONER

## 2025-08-14 PROCEDURE — 6360000002 HC RX W HCPCS: Performed by: FAMILY MEDICINE

## 2025-08-14 PROCEDURE — 93306 TTE W/DOPPLER COMPLETE: CPT

## 2025-08-14 PROCEDURE — A9500 TC99M SESTAMIBI: HCPCS | Performed by: NURSE PRACTITIONER

## 2025-08-14 RX ORDER — REGADENOSON 0.08 MG/ML
0.4 INJECTION, SOLUTION INTRAVENOUS
Status: COMPLETED | OUTPATIENT
Start: 2025-08-14 | End: 2025-08-14

## 2025-08-14 RX ORDER — TETRAKIS(2-METHOXYISOBUTYLISOCYANIDE)COPPER(I) TETRAFLUOROBORATE 1 MG/ML
30 INJECTION, POWDER, LYOPHILIZED, FOR SOLUTION INTRAVENOUS
Status: COMPLETED | OUTPATIENT
Start: 2025-08-14 | End: 2025-08-14

## 2025-08-14 RX ADMIN — REGADENOSON 0.4 MG: 0.08 INJECTION, SOLUTION INTRAVENOUS at 11:04

## 2025-08-14 RX ADMIN — Medication 30 MILLICURIE: at 10:52

## 2025-08-15 ENCOUNTER — HOSPITAL ENCOUNTER (OUTPATIENT)
Dept: NUCLEAR MEDICINE | Age: 70
Discharge: HOME | End: 2025-08-17
Payer: MEDICARE

## 2025-08-15 LAB
NUC STRESS EJECTION FRACTION: 70 %
STRESS BASELINE DIAS BP: 82 MMHG
STRESS BASELINE HR: 69 BPM
STRESS BASELINE SYS BP: 148 MMHG
STRESS ESTIMATED WORKLOAD: 1 METS
STRESS PEAK DIAS BP: 88 MMHG
STRESS PEAK SYS BP: 162 MMHG
STRESS PERCENT HR ACHIEVED: 60 %
STRESS POST PEAK HR: 91 BPM
STRESS RATE PRESSURE PRODUCT: NORMAL BPM*MMHG
STRESS TARGET HR: 152 BPM

## 2025-08-15 PROCEDURE — 3430000000 HC RX DIAGNOSTIC RADIOPHARMACEUTICAL: Performed by: NURSE PRACTITIONER

## 2025-08-15 PROCEDURE — A9500 TC99M SESTAMIBI: HCPCS | Performed by: NURSE PRACTITIONER

## 2025-08-15 RX ORDER — TETRAKIS(2-METHOXYISOBUTYLISOCYANIDE)COPPER(I) TETRAFLUOROBORATE 1 MG/ML
30 INJECTION, POWDER, LYOPHILIZED, FOR SOLUTION INTRAVENOUS
Status: COMPLETED | OUTPATIENT
Start: 2025-08-15 | End: 2025-08-15

## 2025-08-15 RX ADMIN — Medication 30 MILLICURIE: at 13:34

## 2025-08-20 ENCOUNTER — HOSPITAL ENCOUNTER (OUTPATIENT)
Dept: LAB | Age: 70
Discharge: HOME | End: 2025-08-20
Payer: MEDICARE

## 2025-08-20 ENCOUNTER — OFFICE VISIT (OUTPATIENT)
Dept: CARDIOLOGY | Age: 70
End: 2025-08-20
Payer: MEDICARE

## 2025-08-20 ENCOUNTER — RESULTS FOLLOW-UP (OUTPATIENT)
Dept: CARDIOLOGY | Age: 70
End: 2025-08-20

## 2025-08-20 VITALS
BODY MASS INDEX: 34.99 KG/M2 | SYSTOLIC BLOOD PRESSURE: 133 MMHG | DIASTOLIC BLOOD PRESSURE: 70 MMHG | HEART RATE: 83 BPM | HEIGHT: 65 IN | RESPIRATION RATE: 16 BRPM | WEIGHT: 210 LBS | OXYGEN SATURATION: 96 %

## 2025-08-20 DIAGNOSIS — I10 PRIMARY HYPERTENSION: ICD-10-CM

## 2025-08-20 DIAGNOSIS — I25.10 ASHD (ARTERIOSCLEROTIC HEART DISEASE): ICD-10-CM

## 2025-08-20 DIAGNOSIS — R07.89 ATYPICAL CHEST PAIN: ICD-10-CM

## 2025-08-20 DIAGNOSIS — R94.39 ABNORMAL STRESS TEST: ICD-10-CM

## 2025-08-20 DIAGNOSIS — I25.2 HISTORY OF NON-ST ELEVATION MYOCARDIAL INFARCTION (NSTEMI): ICD-10-CM

## 2025-08-20 DIAGNOSIS — E78.2 MIXED HYPERLIPIDEMIA: ICD-10-CM

## 2025-08-20 DIAGNOSIS — I21.4 NSTEMI (NON-ST ELEVATED MYOCARDIAL INFARCTION) (HCC): ICD-10-CM

## 2025-08-20 DIAGNOSIS — E66.9 OBESITY, UNSPECIFIED CLASS, UNSPECIFIED OBESITY TYPE, UNSPECIFIED WHETHER SERIOUS COMORBIDITY PRESENT: ICD-10-CM

## 2025-08-20 DIAGNOSIS — E87.5 HYPERKALEMIA: Primary | ICD-10-CM

## 2025-08-20 DIAGNOSIS — R00.2 INTERMITTENT PALPITATIONS: ICD-10-CM

## 2025-08-20 DIAGNOSIS — R94.39 ABNORMAL STRESS TEST: Primary | ICD-10-CM

## 2025-08-20 LAB
ANION GAP SERPL CALCULATED.3IONS-SCNC: 15 MMOL/L (ref 9–16)
BUN SERPL-MCNC: 17 MG/DL (ref 8–23)
BUN/CREAT SERPL: 19 (ref 9–20)
CALCIUM SERPL-MCNC: 10 MG/DL (ref 8.6–10.4)
CHLORIDE SERPL-SCNC: 101 MMOL/L (ref 98–107)
CO2 SERPL-SCNC: 18 MMOL/L (ref 20–31)
CREAT SERPL-MCNC: 0.9 MG/DL (ref 0.5–0.9)
ERYTHROCYTE [DISTWIDTH] IN BLOOD BY AUTOMATED COUNT: 12.7 % (ref 11.8–14.4)
GFR, ESTIMATED: 74 ML/MIN/1.73M2
GLUCOSE SERPL-MCNC: 142 MG/DL (ref 74–99)
HCT VFR BLD AUTO: 39.9 % (ref 36.3–47.1)
HGB BLD-MCNC: 13.5 G/DL (ref 11.9–15.1)
MCH RBC QN AUTO: 32.1 PG (ref 25.2–33.5)
MCHC RBC AUTO-ENTMCNC: 33.8 G/DL (ref 28.4–34.8)
MCV RBC AUTO: 94.8 FL (ref 82.6–102.9)
NRBC BLD-RTO: 0 PER 100 WBC
PLATELET # BLD AUTO: 298 K/UL (ref 138–453)
PMV BLD AUTO: 9.5 FL (ref 8.1–13.5)
POTASSIUM SERPL-SCNC: 5.4 MMOL/L (ref 3.7–5.3)
RBC # BLD AUTO: 4.21 M/UL (ref 3.95–5.11)
SODIUM SERPL-SCNC: 134 MMOL/L (ref 136–145)
WBC OTHER # BLD: 14.1 K/UL (ref 3.5–11.3)

## 2025-08-20 PROCEDURE — 36415 COLL VENOUS BLD VENIPUNCTURE: CPT

## 2025-08-20 PROCEDURE — 85027 COMPLETE CBC AUTOMATED: CPT

## 2025-08-20 PROCEDURE — 1159F MED LIST DOCD IN RCRD: CPT | Performed by: NURSE PRACTITIONER

## 2025-08-20 PROCEDURE — 1160F RVW MEDS BY RX/DR IN RCRD: CPT | Performed by: NURSE PRACTITIONER

## 2025-08-20 PROCEDURE — 3017F COLORECTAL CA SCREEN DOC REV: CPT | Performed by: NURSE PRACTITIONER

## 2025-08-20 PROCEDURE — G8400 PT W/DXA NO RESULTS DOC: HCPCS | Performed by: NURSE PRACTITIONER

## 2025-08-20 PROCEDURE — 80048 BASIC METABOLIC PNL TOTAL CA: CPT

## 2025-08-20 PROCEDURE — 1036F TOBACCO NON-USER: CPT | Performed by: NURSE PRACTITIONER

## 2025-08-20 PROCEDURE — 1090F PRES/ABSN URINE INCON ASSESS: CPT | Performed by: NURSE PRACTITIONER

## 2025-08-20 PROCEDURE — 99214 OFFICE O/P EST MOD 30 MIN: CPT | Performed by: NURSE PRACTITIONER

## 2025-08-20 PROCEDURE — 1123F ACP DISCUSS/DSCN MKR DOCD: CPT | Performed by: NURSE PRACTITIONER

## 2025-08-20 PROCEDURE — G8417 CALC BMI ABV UP PARAM F/U: HCPCS | Performed by: NURSE PRACTITIONER

## 2025-08-20 PROCEDURE — 3078F DIAST BP <80 MM HG: CPT | Performed by: NURSE PRACTITIONER

## 2025-08-20 PROCEDURE — 3075F SYST BP GE 130 - 139MM HG: CPT | Performed by: NURSE PRACTITIONER

## 2025-08-20 PROCEDURE — G8427 DOCREV CUR MEDS BY ELIG CLIN: HCPCS | Performed by: NURSE PRACTITIONER

## 2025-08-21 PROBLEM — R94.39 ABNORMAL STRESS TEST: Status: ACTIVE | Noted: 2025-08-20

## 2025-08-26 ENCOUNTER — HOSPITAL ENCOUNTER (OUTPATIENT)
Dept: LAB | Age: 70
Discharge: HOME | End: 2025-08-26
Payer: MEDICARE

## 2025-08-26 ENCOUNTER — RESULTS FOLLOW-UP (OUTPATIENT)
Dept: CARDIOLOGY | Age: 70
End: 2025-08-26

## 2025-08-26 DIAGNOSIS — E87.5 HYPERKALEMIA: ICD-10-CM

## 2025-08-26 LAB — POTASSIUM SERPL-SCNC: 4.8 MMOL/L (ref 3.7–5.3)

## 2025-08-26 PROCEDURE — 84132 ASSAY OF SERUM POTASSIUM: CPT

## 2025-08-26 PROCEDURE — 36415 COLL VENOUS BLD VENIPUNCTURE: CPT

## 2025-08-28 ENCOUNTER — HOSPITAL ENCOUNTER (OUTPATIENT)
Age: 70
Setting detail: OUTPATIENT SURGERY
Discharge: HOME | End: 2025-08-28
Attending: FAMILY MEDICINE | Admitting: FAMILY MEDICINE
Payer: MEDICARE

## 2025-08-28 VITALS
OXYGEN SATURATION: 98 % | SYSTOLIC BLOOD PRESSURE: 147 MMHG | RESPIRATION RATE: 19 BRPM | DIASTOLIC BLOOD PRESSURE: 56 MMHG | TEMPERATURE: 98.4 F | HEART RATE: 72 BPM

## 2025-08-28 DIAGNOSIS — R94.39 ABNORMAL STRESS TEST: ICD-10-CM

## 2025-08-28 PROCEDURE — 7100000010 HC PHASE II RECOVERY - FIRST 15 MIN: Performed by: FAMILY MEDICINE

## 2025-08-28 PROCEDURE — 99152 MOD SED SAME PHYS/QHP 5/>YRS: CPT | Performed by: FAMILY MEDICINE

## 2025-08-28 PROCEDURE — C1894 INTRO/SHEATH, NON-LASER: HCPCS | Performed by: FAMILY MEDICINE

## 2025-08-28 PROCEDURE — 93458 L HRT ARTERY/VENTRICLE ANGIO: CPT | Performed by: FAMILY MEDICINE

## 2025-08-28 PROCEDURE — C1769 GUIDE WIRE: HCPCS | Performed by: FAMILY MEDICINE

## 2025-08-28 PROCEDURE — 7100000011 HC PHASE II RECOVERY - ADDTL 15 MIN: Performed by: FAMILY MEDICINE

## 2025-08-28 PROCEDURE — 2500000003 HC RX 250 WO HCPCS: Performed by: FAMILY MEDICINE

## 2025-08-28 PROCEDURE — 6360000002 HC RX W HCPCS: Performed by: FAMILY MEDICINE

## 2025-08-28 PROCEDURE — 2709999900 HC NON-CHARGEABLE SUPPLY: Performed by: FAMILY MEDICINE

## 2025-08-28 PROCEDURE — 6360000004 HC RX CONTRAST MEDICATION: Performed by: FAMILY MEDICINE

## 2025-08-28 RX ORDER — LIDOCAINE HYDROCHLORIDE 10 MG/ML
INJECTION, SOLUTION INFILTRATION; PERINEURAL PRN
Status: DISCONTINUED | OUTPATIENT
Start: 2025-08-28 | End: 2025-08-28 | Stop reason: HOSPADM

## 2025-08-28 RX ORDER — VERAPAMIL HYDROCHLORIDE 2.5 MG/ML
INJECTION INTRAVENOUS PRN
Status: DISCONTINUED | OUTPATIENT
Start: 2025-08-28 | End: 2025-08-28 | Stop reason: HOSPADM

## 2025-08-28 RX ORDER — SODIUM CHLORIDE 9 MG/ML
INJECTION, SOLUTION INTRAVENOUS CONTINUOUS
Status: DISCONTINUED | OUTPATIENT
Start: 2025-08-28 | End: 2025-08-28 | Stop reason: HOSPADM

## 2025-08-28 RX ORDER — NITROGLYCERIN 0.4 MG/1
0.4 TABLET SUBLINGUAL EVERY 5 MIN PRN
Status: DISCONTINUED | OUTPATIENT
Start: 2025-08-28 | End: 2025-08-28 | Stop reason: HOSPADM

## 2025-08-28 RX ORDER — HEPARIN SODIUM 10000 [USP'U]/ML
INJECTION, SOLUTION INTRAVENOUS; SUBCUTANEOUS PRN
Status: DISCONTINUED | OUTPATIENT
Start: 2025-08-28 | End: 2025-08-28 | Stop reason: HOSPADM

## 2025-08-28 RX ORDER — SODIUM CHLORIDE 0.9 % (FLUSH) 0.9 %
5-40 SYRINGE (ML) INJECTION PRN
Status: DISCONTINUED | OUTPATIENT
Start: 2025-08-28 | End: 2025-08-28 | Stop reason: HOSPADM

## 2025-08-28 RX ORDER — NITROGLYCERIN 20 MG/100ML
INJECTION INTRAVENOUS PRN
Status: DISCONTINUED | OUTPATIENT
Start: 2025-08-28 | End: 2025-08-28 | Stop reason: HOSPADM

## 2025-08-28 RX ORDER — IOPAMIDOL 755 MG/ML
INJECTION, SOLUTION INTRAVASCULAR PRN
Status: DISCONTINUED | OUTPATIENT
Start: 2025-08-28 | End: 2025-08-28 | Stop reason: HOSPADM

## 2025-08-28 RX ORDER — SODIUM CHLORIDE 0.9 % (FLUSH) 0.9 %
5-40 SYRINGE (ML) INJECTION EVERY 12 HOURS SCHEDULED
Status: DISCONTINUED | OUTPATIENT
Start: 2025-08-28 | End: 2025-08-28 | Stop reason: HOSPADM

## 2025-08-28 RX ORDER — ACETAMINOPHEN 325 MG/1
650 TABLET ORAL EVERY 4 HOURS PRN
Status: DISCONTINUED | OUTPATIENT
Start: 2025-08-28 | End: 2025-08-28 | Stop reason: HOSPADM

## 2025-08-28 RX ORDER — MIDAZOLAM HYDROCHLORIDE 1 MG/ML
INJECTION, SOLUTION INTRAMUSCULAR; INTRAVENOUS PRN
Status: DISCONTINUED | OUTPATIENT
Start: 2025-08-28 | End: 2025-08-28 | Stop reason: HOSPADM

## 2025-08-28 RX ORDER — SODIUM CHLORIDE 9 MG/ML
INJECTION, SOLUTION INTRAVENOUS PRN
Status: DISCONTINUED | OUTPATIENT
Start: 2025-08-28 | End: 2025-08-28 | Stop reason: HOSPADM

## 2025-08-28 RX ORDER — DIPHENHYDRAMINE HCL 50 MG
50 CAPSULE ORAL ONCE
Status: DISCONTINUED | OUTPATIENT
Start: 2025-08-28 | End: 2025-08-28 | Stop reason: HOSPADM

## (undated) DEVICE — NEEDLE 25GAX5.0MM INJ CARR LOCKE

## (undated) DEVICE — GUIDEWIRE VASC L260CM DIA0.035IN TAPR L15CM STD S STL PTFE

## (undated) DEVICE — KIT INTRO SHTH 6FR L10CM DIA0.087IN NDL L1.5IN 21GA GWIRE

## (undated) DEVICE — DRAPE SURG W46XL48IN 2.75IN CIR FEN POLYPR RAD FOR BRACH

## (undated) DEVICE — CATHETER DIAG 6FR L110CM TIG4 RAD COR SIDE H OPTITORQUE

## (undated) DEVICE — MANIFOLD KIT WASTE MERCY TIFFIN

## (undated) DEVICE — BAG C ARM 22 IN LEN 22 IN W LTX FREE ST SNAP

## (undated) DEVICE — MEDI-VAC NON-CONDUCTIVE TUBING7MM X 30.5 (100FT): Brand: CARDINAL HEALTH

## (undated) DEVICE — GLOVE ORANGE PI 7 1/2   MSG9075

## (undated) DEVICE — Device

## (undated) DEVICE — SOLUTION IV IRRIG POUR BRL 0.9% SODIUM CHL 2F7124

## (undated) DEVICE — CANNULA ORAL NSL AD CO2 N INTUB O2 DEL DISP TRU LNK

## (undated) DEVICE — CATHETER DIAG 5FR L110CM PIG CRV SZ 6 SIDE H DBL BRAID WIRE